# Patient Record
Sex: MALE | Race: WHITE | NOT HISPANIC OR LATINO | Employment: OTHER | URBAN - METROPOLITAN AREA
[De-identification: names, ages, dates, MRNs, and addresses within clinical notes are randomized per-mention and may not be internally consistent; named-entity substitution may affect disease eponyms.]

---

## 2023-08-31 PROBLEM — Z90.49 S/P CHOLECYSTECTOMY: Status: ACTIVE | Noted: 2023-08-31

## 2023-08-31 PROBLEM — F43.21 SITUATIONAL DEPRESSION: Status: ACTIVE | Noted: 2023-08-31

## 2023-08-31 PROBLEM — Z93.1: Status: ACTIVE | Noted: 2023-08-31

## 2023-08-31 PROBLEM — R41.3 MEMORY DIFFICULTY: Status: ACTIVE | Noted: 2023-08-31

## 2023-08-31 PROBLEM — G95.9 CERVICAL MYELOPATHY WITH CERVICAL RADICULOPATHY (MULTI): Status: ACTIVE | Noted: 2023-08-31

## 2023-08-31 PROBLEM — R41.840 ATTENTION DISTURBANCE: Status: ACTIVE | Noted: 2023-08-31

## 2023-08-31 PROBLEM — E55.9 VITAMIN D DEFICIENCY: Status: ACTIVE | Noted: 2023-08-31

## 2023-08-31 PROBLEM — E03.9 ACQUIRED HYPOTHYROIDISM: Status: ACTIVE | Noted: 2023-08-31

## 2023-08-31 PROBLEM — F41.9 ANXIETY DISORDER: Status: ACTIVE | Noted: 2023-08-31
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
## 2023-08-31 PROBLEM — C32.0 MALIGNANT NEOPLASM OF GLOTTIS (MULTI): Status: ACTIVE | Noted: 2023-08-31

## 2023-08-31 PROBLEM — K21.9 GERD (GASTROESOPHAGEAL REFLUX DISEASE): Status: ACTIVE | Noted: 2023-08-31

## 2023-08-31 PROBLEM — R41.89 COGNITIVE DEFICITS: Status: ACTIVE | Noted: 2023-08-31

## 2023-08-31 PROBLEM — M54.12 CERVICAL MYELOPATHY WITH CERVICAL RADICULOPATHY (MULTI): Status: ACTIVE | Noted: 2023-08-31

## 2023-08-31 PROBLEM — T66.XXXA COMPLICATION OF RADIOTHERAPY: Status: ACTIVE | Noted: 2023-08-31

## 2023-08-31 PROBLEM — J38.01 UNILATERAL PARTIAL VOCAL CORD PARALYSIS: Status: ACTIVE | Noted: 2023-08-31

## 2023-08-31 PROBLEM — M96.1 CERVICAL POST-LAMINECTOMY SYNDROME: Status: ACTIVE | Noted: 2023-08-31

## 2023-08-31 PROBLEM — M48.02 CERVICAL SPINAL STENOSIS: Status: ACTIVE | Noted: 2023-08-31

## 2023-08-31 RX ORDER — LEVOTHYROXINE SODIUM 75 UG/1
1 TABLET ORAL DAILY
COMMUNITY
End: 2024-05-31 | Stop reason: WASHOUT

## 2023-08-31 RX ORDER — GABAPENTIN 250 MG/5ML
12 SOLUTION ORAL 3 TIMES DAILY
COMMUNITY
Start: 2018-04-03 | End: 2023-11-17 | Stop reason: SDUPTHER

## 2023-08-31 RX ORDER — KETOCONAZOLE 20 MG/G
CREAM TOPICAL 2 TIMES DAILY
COMMUNITY
Start: 2022-05-25 | End: 2024-05-31 | Stop reason: WASHOUT

## 2023-08-31 RX ORDER — METHOCARBAMOL 500 MG/1
TABLET, FILM COATED ORAL
COMMUNITY
Start: 2023-01-30 | End: 2024-01-22 | Stop reason: SDUPTHER

## 2023-08-31 RX ORDER — OXYCODONE HYDROCHLORIDE 10 MG/1
1 TABLET ORAL EVERY 4 HOURS PRN
COMMUNITY
Start: 2022-03-11 | End: 2023-10-05 | Stop reason: SDUPTHER

## 2023-08-31 RX ORDER — LEVOTHYROXINE SODIUM 112 UG/1
1 TABLET ORAL
COMMUNITY
Start: 2022-09-08

## 2023-09-19 LAB — THYROTROPIN (MIU/L) IN SER/PLAS BY DETECTION LIMIT <= 0.05 MIU/L: 0.49 MIU/L (ref 0.44–3.98)

## 2023-10-05 DIAGNOSIS — G89.3 CANCER RELATED PAIN: Primary | ICD-10-CM

## 2023-10-06 RX ORDER — OXYCODONE HYDROCHLORIDE 10 MG/1
10 TABLET ORAL EVERY 6 HOURS PRN
Qty: 120 TABLET | Refills: 0 | Status: SHIPPED | OUTPATIENT
Start: 2023-10-06 | End: 2023-11-02 | Stop reason: SDUPTHER

## 2023-10-09 ENCOUNTER — OFFICE VISIT (OUTPATIENT)
Dept: HEMATOLOGY/ONCOLOGY | Facility: CLINIC | Age: 58
End: 2023-10-09
Payer: COMMERCIAL

## 2023-10-09 VITALS
HEART RATE: 95 BPM | WEIGHT: 194.67 LBS | HEIGHT: 70 IN | RESPIRATION RATE: 18 BRPM | DIASTOLIC BLOOD PRESSURE: 88 MMHG | SYSTOLIC BLOOD PRESSURE: 165 MMHG | BODY MASS INDEX: 27.87 KG/M2 | TEMPERATURE: 96.4 F | OXYGEN SATURATION: 97 %

## 2023-10-09 DIAGNOSIS — G89.3 CANCER RELATED PAIN: ICD-10-CM

## 2023-10-09 DIAGNOSIS — C32.0 MALIGNANT NEOPLASM OF GLOTTIS (MULTI): ICD-10-CM

## 2023-10-09 DIAGNOSIS — Z51.5 ENCOUNTER FOR PALLIATIVE CARE: Primary | ICD-10-CM

## 2023-10-09 PROCEDURE — 99213 OFFICE O/P EST LOW 20 MIN: CPT | Performed by: NURSE PRACTITIONER

## 2023-10-09 ASSESSMENT — PAIN SCALES - GENERAL: PAINLEVEL: 0-NO PAIN

## 2023-10-09 ASSESSMENT — PATIENT HEALTH QUESTIONNAIRE - PHQ9
SUM OF ALL RESPONSES TO PHQ9 QUESTIONS 1 AND 2: 0
1. LITTLE INTEREST OR PLEASURE IN DOING THINGS: NOT AT ALL
2. FEELING DOWN, DEPRESSED OR HOPELESS: NOT AT ALL

## 2023-10-09 ASSESSMENT — ENCOUNTER SYMPTOMS
OCCASIONAL FEELINGS OF UNSTEADINESS: 0
DEPRESSION: 0
LOSS OF SENSATION IN FEET: 0

## 2023-10-09 ASSESSMENT — COLUMBIA-SUICIDE SEVERITY RATING SCALE - C-SSRS
6. HAVE YOU EVER DONE ANYTHING, STARTED TO DO ANYTHING, OR PREPARED TO DO ANYTHING TO END YOUR LIFE?: NO
2. HAVE YOU ACTUALLY HAD ANY THOUGHTS OF KILLING YOURSELF?: NO
1. IN THE PAST MONTH, HAVE YOU WISHED YOU WERE DEAD OR WISHED YOU COULD GO TO SLEEP AND NOT WAKE UP?: NO

## 2023-10-09 NOTE — PROGRESS NOTES
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   -has chronic pain related to past surgery for his previous cancer  -Last saw ENT in 3/2023, will follow up again in 6 months. At this appointment, he showed no evidence of disease  -PEG tube exchanged periodically; ENT recommends this soon.   -Patient takes nothing in by mouth due to vocal cord paralysis.    Subjective   HISTORY OF PRESENT ILLNESS: Blane Sharma is a 57 yo male with PMHx of recurrent laryngeal papillomatosis, pain , ETOH abuse (in recovery), and SCC of vocal cord.      He present to supportive oncology clinic for follow up for pain and symptom management.     Pain Assessment:  Pain Score: 7  Location:  neck/ throat    Symptom Assessment:  Pain:somewhat  Headache: none  Dizziness:none  Lack of energy: none  Difficulty sleeping: none  Worrying: none  Anxiety: none  Depression: none  Pain in mouth/swallowing: a little  Dry mouth: a little  Taste changes: none  Shortness of breath: none  Lack of appetite: none   Nausea: a little  Vomiting: none  Constipation: none  Diarrhea: none  Sore muscles: none  Numbness or tingling in hands/feet/other: somewhat  Weight loss: none      Information obtained from: interview of patient  ______________________________________________________________________        Objective                PHYSICAL EXAMINATION   Vital Signs:   Vital signs reviewed  Vitals:    10/09/23 1005   BP: 165/88   Pulse: 95   Resp: 18   Temp: 35.8 °C (96.4 °F)   SpO2: 97%     Pain Score:        Physical Exam    ASSESSMENT/PLAN    Pain  Pain is: post-operative  Type: neuropathic  Pain control: well-controlled  Home regimen:   - Continue Oxycodone 10 mg by mouth q6 as needed. Rx sent for fill on 11/4    - Continue 900 mg gabapentin liquid 3 times a day. Rx sent for fill on 10/18.   - Continue 500mg Methocarbamol 1-2 tablets as needed for muscle spasms. Usually taking 1000mg BID.  Intolerances/previously tried:   - Stopped 10mg Baclofen, pt reported urinary retention     Opioid Use  Medication Management:   - OARRS report reviewed with no aberrant behavior; consistent with  prescriptions/records and patient history  - MED 60.  Overdose Risk Score 250.   This has been discussed with patient.   - We will continue to closely monitor the patient for signs of prescription misuse including UDS, OARRS review and subjective reports at each visit.  - no concurrent benzodiazepine use   - I am a provider who either is or has consulted and collaborated with a provider certified in Hospice and Palliative Medicine and have conducted a face-face visit and examination for this patient.  - Routine Urine Drug Screen completed: 7/10/23, (+) THC, appropriately positive for opioids and negative for illicit substances  - Controlled Substance Agreement completed: 4/17/23  - Specifically discussed that controlled substance prescriptions will only be provided by our group as outlined in the completed agreement  - Prescribed naloxone: pt declined   - Red Flags: history of ETOH misuse     Constipation  At risk for constipation related to opioids,  currently not constipated  Home regimen: -continue 1/2 cap of miralax daily PRN    Altered Mood  Chronic depression related to  psychosocial issues outside of cancer treatment     controlled with home regimen  Home regimen:   -Has a support dog. Feels supported by fiance and daughter.   - Has refused offer for evaluation by psychiatry at previous visit.  - His velma is a source of support. Refer to  PRN    Next Follow-Up Visit:  Return to clinic in 3 months    Signature and billing  Medical complexity was low level due to due to complexity of problems, extensive data review, and high  risk of management/treatment.  Time was spent on the following: Time Directly with Patient/Family/Caregiver, Documentation Time. Total time spent: 30     Some elements copied from my note on 7/10/23, the elements have been updated and all reflect current decision making from today, 10/9/2023.      Plan of Care discussed with: Patient    SIGNATURE: CARLO Barlow-CNP    Contact information:  Supportive and Palliative Oncology  Monday-Friday 8 AM-5 PM  Phone:  [phone], press option #5, then option #1.   Or Epic Secure Chat

## 2023-10-31 ENCOUNTER — NUTRITION (OUTPATIENT)
Dept: HEMATOLOGY/ONCOLOGY | Facility: HOSPITAL | Age: 58
End: 2023-10-31
Payer: COMMERCIAL

## 2023-10-31 NOTE — PROGRESS NOTES
NUTRITION COMMUNICATION NOTE    Blane Sharma     REASON FOR COMMUNICATION:     Received a VM from [facility]  There are many shortages in TF products  Patient will need to receive Isosource 1.5 and will receive in 2-5 business days.  It is too early to call but will call today to let him know     Wt Readings from Last 10 Encounters:   10/09/23 88.3 kg (194 lb 10.7 oz)   06/16/23 91.3 kg (201 lb 3 oz)   03/17/23 90.5 kg (199 lb 9 oz)   01/30/23 91.9 kg (202 lb 9.6 oz)   11/07/22 84.1 kg (185 lb 6.5 oz)   09/16/22 90.3 kg (199 lb)   08/15/22 89.9 kg (198 lb 3.1 oz)   06/22/22 88.2 kg (194 lb 6 oz)   05/25/22 88.5 kg (195 lb 3 oz)   04/27/22 86.3 kg (190 lb 3 oz)       TF had been   Confirmed order is for Osmolite 1.5, 6 cans/day provides: 2130 kcal, 89.4g PRO, and 1086 mL free water  Uses large bore feeding bags  (Patient was transitioned from Vital 1.5 to the Osmolite 1.5)    New TF will be Isosource 1.5- 6 per day to provide 2250 calories, 85 g protein and 955 ml of free water.            Time Spent  Prep time on day of patient encounter: 10 minutes  Time spent directly with patient, family or caregiver: 10 minutes  Additional Time Spent on Patient Care Activities: 5 minutes  Documentation Time: 15 minutes  Other Time Spent: 0 minutes  Total: 40 minutes

## 2023-11-02 ENCOUNTER — TELEPHONE (OUTPATIENT)
Dept: ADMISSION | Facility: HOSPITAL | Age: 58
End: 2023-11-02
Payer: COMMERCIAL

## 2023-11-02 DIAGNOSIS — G89.3 CANCER RELATED PAIN: ICD-10-CM

## 2023-11-02 RX ORDER — OXYCODONE HYDROCHLORIDE 10 MG/1
10 TABLET ORAL EVERY 6 HOURS PRN
Qty: 120 TABLET | Refills: 0 | Status: SHIPPED | OUTPATIENT
Start: 2023-11-02 | End: 2023-12-01 | Stop reason: SDUPTHER

## 2023-11-02 NOTE — TELEPHONE ENCOUNTER
OARRS report reviewed and reflects  prescription history, no aberrancy noted. Per OARRS, patient last filled Oxycodone 10 mg #120/30  day supply on 10/6/23 Per last visit with Provider Lisseth NP on 10/9/23 patient to continue Oxycodone. Patient with follow up visit scheduled with Provider Lisseth NP on 1/9/2024. Patient updated that script dated 11/4/23 has already been sent to [facility] by Mona Montanez NP at time of last visit . Call to [facility] to confirm. Pharmacist states that she does not  have the script. Secure message sent to Provider Lisseth requesting a new script.  Patient updated.

## 2023-11-02 NOTE — TELEPHONE ENCOUNTER
Blane Sharma called the refill line for Oxycodone 10mg. Would like refills to be sent to [facility] pharmacy on file. Message sent to Palliative Care team to send in.

## 2023-11-17 ENCOUNTER — TELEPHONE (OUTPATIENT)
Dept: ADMISSION | Facility: HOSPITAL | Age: 58
End: 2023-11-17
Payer: COMMERCIAL

## 2023-11-17 DIAGNOSIS — M79.2 NEUROPATHIC PAIN: Primary | ICD-10-CM

## 2023-11-17 RX ORDER — GABAPENTIN 250 MG/5ML
900 SOLUTION ORAL 3 TIMES DAILY
Qty: 1620 ML | Refills: 0 | Status: SHIPPED | OUTPATIENT
Start: 2023-11-17 | End: 2023-12-15 | Stop reason: SDUPTHER

## 2023-11-17 NOTE — TELEPHONE ENCOUNTER
Medication Refill-  Gabapentin 250mg/5ml      Pharmacy-  [facility] #[id number]

## 2023-11-17 NOTE — TELEPHONE ENCOUNTER
OARRS report reviewed and reflects  prescription history, no aberrancy noted. Per OARRS, patient due for Gabapentin #250ml/30 day supply (taking 18ml TID). Per last visit with Mona Montanez CNP on 10/9/23 patient to continue medication. Patient with follow up visit scheduled with Mona on 1/9/24. Patient updated that medication will be sent to [facility]. Refill request routed to provider.     Patient states his spouse (also a patient of Mona Montanez CNP also has refill needs). I will address these, as well.

## 2023-12-01 ENCOUNTER — TELEPHONE (OUTPATIENT)
Dept: ADMISSION | Facility: HOSPITAL | Age: 58
End: 2023-12-01
Payer: COMMERCIAL

## 2023-12-01 DIAGNOSIS — G89.3 CANCER RELATED PAIN: ICD-10-CM

## 2023-12-01 RX ORDER — OXYCODONE HYDROCHLORIDE 10 MG/1
10 TABLET ORAL EVERY 6 HOURS PRN
Qty: 120 TABLET | Refills: 0 | Status: SHIPPED | OUTPATIENT
Start: 2023-12-01 | End: 2024-01-02 | Stop reason: SDUPTHER

## 2023-12-01 NOTE — TELEPHONE ENCOUNTER
Pt requesting a refill of oxycodone 10mg every 6hrs prn, #120.  Preferred pharmacy is [facility] in [place].

## 2023-12-08 ENCOUNTER — TELEPHONE (OUTPATIENT)
Dept: OTOLARYNGOLOGY | Facility: HOSPITAL | Age: 58
End: 2023-12-08
Payer: COMMERCIAL

## 2023-12-08 DIAGNOSIS — J98.8 AIRWAY OBSTRUCTION: ICD-10-CM

## 2023-12-08 DIAGNOSIS — C32.0 MALIGNANT NEOPLASM OF GLOTTIS (MULTI): ICD-10-CM

## 2023-12-08 DIAGNOSIS — R06.02 SHORTNESS OF BREATH ON EXERTION: ICD-10-CM

## 2023-12-08 NOTE — TELEPHONE ENCOUNTER
Message left for Martin that I won't be able to answer this question until Tuesday next week as Dr. Lipscomb is out of the office until Monday.  He's in surgery on Monday and I'm working with another provider on Monday, so I won't see him until Tuesday.    I did review the chart and in 2019 Dr. 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

## 2023-12-08 NOTE — TELEPHONE ENCOUNTER
Dr. Lipscomb did sign for the placard even though he was out of town.    I printed the script and called Blane to inform him.  He was appreciative.  I asked if he wanted me to mail it to him and he stated that would be fine.    Script for handicap placard mailed to his home.

## 2023-12-15 ENCOUNTER — TELEPHONE (OUTPATIENT)
Dept: HEMATOLOGY/ONCOLOGY | Facility: CLINIC | Age: 58
End: 2023-12-15
Payer: COMMERCIAL

## 2023-12-15 DIAGNOSIS — M79.2 NEUROPATHIC PAIN: ICD-10-CM

## 2023-12-15 RX ORDER — GABAPENTIN 250 MG/5ML
900 SOLUTION ORAL 3 TIMES DAILY
Qty: 1620 ML | Refills: 0 | Status: SHIPPED | OUTPATIENT
Start: 2023-12-15 | End: 2024-01-15 | Stop reason: SDUPTHER

## 2023-12-15 NOTE — TELEPHONE ENCOUNTER
OARRS report reviewed and reflects  prescription history, no aberrancy noted. Per OARRS, patient due for Gabapentin 12/16/23. Per last visit with Mona Montanez CNP on 10/9/23 patient to continue Gabapentin 900mg TID (prescribed liquid form). Patient with follow up visit scheduled with Mona on 1/9/24. Patient updated that medication will be sent to [facility] via . Refill request routed to covering provider.

## 2024-01-02 ENCOUNTER — TELEPHONE (OUTPATIENT)
Dept: ADMISSION | Facility: HOSPITAL | Age: 59
End: 2024-01-02
Payer: COMMERCIAL

## 2024-01-02 DIAGNOSIS — G89.3 CANCER RELATED PAIN: ICD-10-CM

## 2024-01-02 RX ORDER — OXYCODONE HYDROCHLORIDE 10 MG/1
10 TABLET ORAL EVERY 6 HOURS PRN
Qty: 120 TABLET | Refills: 0 | Status: SHIPPED | OUTPATIENT
Start: 2024-01-02 | End: 2024-01-22 | Stop reason: SDUPTHER

## 2024-01-02 NOTE — TELEPHONE ENCOUNTER
Pt requesting refill   Oxycodone 10mg. 1 tablet every 6 hrs prn  Pharmacy on file   Last FUV 10/9 with next FUV 1/9

## 2024-01-09 ENCOUNTER — APPOINTMENT (OUTPATIENT)
Dept: PALLIATIVE MEDICINE | Facility: CLINIC | Age: 59
End: 2024-01-09
Payer: COMMERCIAL

## 2024-01-15 ENCOUNTER — DOCUMENTATION (OUTPATIENT)
Dept: PALLIATIVE MEDICINE | Facility: HOSPITAL | Age: 59
End: 2024-01-15
Payer: COMMERCIAL

## 2024-01-15 DIAGNOSIS — M79.2 NEUROPATHIC PAIN: ICD-10-CM

## 2024-01-15 RX ORDER — GABAPENTIN 250 MG/5ML
900 SOLUTION ORAL 3 TIMES DAILY
Qty: 1620 ML | Refills: 0 | Status: SHIPPED | OUTPATIENT
Start: 2024-01-15 | End: 2024-01-22 | Stop reason: SDUPTHER

## 2024-01-15 NOTE — PROGRESS NOTES
Received faxed refill request from [facility]. Per OARRS , patient is due for refill. Last fill was on 12/18/23 1350ml/#30 days ( short supply). Per last visit with Provider Lisseth, Patient to continue Gabapentin 900 mg TID. Follow up visit scheduled for 1/22/24. Script pended to Provider Lisseth for approval.

## 2024-01-17 ENCOUNTER — HOSPITAL ENCOUNTER (OUTPATIENT)
Dept: RADIOLOGY | Facility: EXTERNAL LOCATION | Age: 59
Discharge: HOME | End: 2024-01-17

## 2024-01-17 DIAGNOSIS — R05.9 COUGH, UNSPECIFIED TYPE: ICD-10-CM

## 2024-01-22 ENCOUNTER — OFFICE VISIT (OUTPATIENT)
Dept: PALLIATIVE MEDICINE | Facility: CLINIC | Age: 59
End: 2024-01-22
Payer: COMMERCIAL

## 2024-01-22 VITALS
DIASTOLIC BLOOD PRESSURE: 93 MMHG | SYSTOLIC BLOOD PRESSURE: 145 MMHG | TEMPERATURE: 98.6 F | BODY MASS INDEX: 26.75 KG/M2 | HEART RATE: 92 BPM | OXYGEN SATURATION: 92 % | RESPIRATION RATE: 18 BRPM | WEIGHT: 184.75 LBS

## 2024-01-22 DIAGNOSIS — M79.2 NEUROPATHIC PAIN: ICD-10-CM

## 2024-01-22 DIAGNOSIS — G89.3 CANCER RELATED PAIN: ICD-10-CM

## 2024-01-22 DIAGNOSIS — Z51.5 ENCOUNTER FOR PALLIATIVE CARE: Primary | ICD-10-CM

## 2024-01-22 PROCEDURE — 99213 OFFICE O/P EST LOW 20 MIN: CPT | Performed by: NURSE PRACTITIONER

## 2024-01-22 PROCEDURE — 1036F TOBACCO NON-USER: CPT | Performed by: NURSE PRACTITIONER

## 2024-01-22 RX ORDER — METHOCARBAMOL 500 MG/1
500-1000 TABLET, FILM COATED ORAL 2 TIMES DAILY PRN
Qty: 120 TABLET | Refills: 3 | Status: SHIPPED | OUTPATIENT
Start: 2024-01-22 | End: 2024-02-21

## 2024-01-22 RX ORDER — GABAPENTIN 250 MG/5ML
900 SOLUTION ORAL 3 TIMES DAILY
Qty: 1620 ML | Refills: 0 | Status: SHIPPED | OUTPATIENT
Start: 2024-01-22 | End: 2024-03-19 | Stop reason: SDUPTHER

## 2024-01-22 RX ORDER — OXYCODONE HYDROCHLORIDE 10 MG/1
10 TABLET ORAL EVERY 6 HOURS PRN
Qty: 120 TABLET | Refills: 0 | Status: SHIPPED | OUTPATIENT
Start: 2024-01-22 | End: 2024-02-29 | Stop reason: SDUPTHER

## 2024-01-22 ASSESSMENT — ENCOUNTER SYMPTOMS
LOSS OF SENSATION IN FEET: 0
OCCASIONAL FEELINGS OF UNSTEADINESS: 0
DEPRESSION: 0

## 2024-01-22 ASSESSMENT — PATIENT HEALTH QUESTIONNAIRE - PHQ9
10. IF YOU CHECKED OFF ANY PROBLEMS, HOW DIFFICULT HAVE THESE PROBLEMS MADE IT FOR YOU TO DO YOUR WORK, TAKE CARE OF THINGS AT HOME, OR GET ALONG WITH OTHER PEOPLE: NOT DIFFICULT AT ALL
2. FEELING DOWN, DEPRESSED OR HOPELESS: NOT AT ALL
1. LITTLE INTEREST OR PLEASURE IN DOING THINGS: SEVERAL DAYS
SUM OF ALL RESPONSES TO PHQ9 QUESTIONS 1 AND 2: 1

## 2024-01-22 ASSESSMENT — COLUMBIA-SUICIDE SEVERITY RATING SCALE - C-SSRS
1. IN THE PAST MONTH, HAVE YOU WISHED YOU WERE DEAD OR WISHED YOU COULD GO TO SLEEP AND NOT WAKE UP?: NO
6. HAVE YOU EVER DONE ANYTHING, STARTED TO DO ANYTHING, OR PREPARED TO DO ANYTHING TO END YOUR LIFE?: NO
2. HAVE YOU ACTUALLY HAD ANY THOUGHTS OF KILLING YOURSELF?: NO

## 2024-01-22 ASSESSMENT — PAIN SCALES - GENERAL: PAINLEVEL: 4

## 2024-01-22 NOTE — PROGRESS NOTES
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   -has chronic pain related to past surgery for his previous cancer  -Last saw ENT in 3/2023, will follow up again in 6 months. At this appointment, he showed no evidence of disease  -PEG tube exchanged periodically; ENT recommends this soon.   -Patient takes nothing in by mouth due to vocal cord paralysis.    Subjective   HISTORY OF PRESENT ILLNESS: Blane Sharma is a 57 yo male with PMHx of recurrent laryngeal papillomatosis, pain , ETOH abuse (in recovery), and SCC of vocal cord.      He present to supportive oncology clinic for follow up for pain and symptom management.     Pt presents for follow up alone today. Has been on antibiotics for PNA for 3 weeks. Pain has been \"up and down.\" Taking oxycodone 3x/day. Moving bowels well. No N/V. Mood is stable. Sleep is okay. Energy levels have been low since PNA, can only work for 5-10 mins before getting out of breath and needing to take a break.     Pain Assessment:  Pain Score: 7  Location: Throat    Symptom Assessment:  Pain:somewhat  Headache: none  Dizziness:none  Lack of energy: somewhat   Difficulty sleeping: none  Worrying: none  Anxiety: none  Depression: none  Pain in mouth/swallowing: a little  Dry mouth: a little  Taste changes: none  Shortness of breath: a little   Lack of appetite: none   Nausea: none  Vomiting: none  Constipation: none  Diarrhea: none  Sore muscles: none  Numbness or tingling in hands/feet/other: somewhat  Weight loss: none      Information obtained from: interview of patient  ______________________________________________________________________        Objective                PHYSICAL EXAMINATION   Vital Signs:   Vital signs " reviewed  Vitals:    01/22/24 1017   BP: (!) 145/93   Pulse: 92   Resp: 18   Temp: 37 °C (98.6 °F)   SpO2: 92%     Pain Score:        Physical Exam    ASSESSMENT/PLAN    Pain  Pain is: post-operative  Type: neuropathic  Pain control: well-controlled  Home regimen:   - Continue Oxycodone 10 mg by mouth q6 as needed. Rx sent for fill on 1/31  - Continue 900 mg gabapentin liquid 3 times a day. Rx sent for fill on 2/15   - Continue 500mg Methocarbamol 1-2 tablets as needed for muscle spasms. Usually taking 1000mg BID. Rx sent today.   Intolerances/previously tried:   - Stopped 10mg Baclofen, pt reported urinary retention     Opioid Use  Medication Management:   - OARRS report reviewed with no aberrant behavior; consistent with  prescriptions/records and patient history  - MED 60.  Overdose Risk Score 400.   This has been discussed with patient.   - We will continue to closely monitor the patient for signs of prescription misuse including UDS, OARRS review and subjective reports at each visit.  - no concurrent benzodiazepine use   - I am a provider who either is or has consulted and collaborated with a provider certified in Hospice and Palliative Medicine and have conducted a face-face visit and examination for this patient.  - Routine Urine Drug Screen completed: 7/10/23, (+) THC, appropriately positive for opioids and negative for illicit substances  - Controlled Substance Agreement completed: 4/17/23  - Specifically discussed that controlled substance prescriptions will only be provided by our group as outlined in the completed agreement  - Prescribed naloxone: pt declined   - Red Flags: history of ETOH misuse     Constipation  At risk for constipation related to opioids,  currently not constipated  Home regimen:   -continue 1/2 cap of miralax daily PRN    Altered Mood  Chronic depression related to  psychosocial issues outside of cancer treatment     controlled with home regimen  Home regimen:   -Has a support dog.  Feels supported by fiance and daughter.   - Has refused offer for evaluation by psychiatry at previous visit.  - His velma is a source of support. Refer to  PRN        Next Follow-Up Visit:  Return to clinic in 3 months    Signature and billing  Medical complexity was low level due to due to complexity of problems, extensive data review, and high risk of management/treatment.  Time was spent on the following: Time Directly with Patient/Family/Caregiver, Documentation Time. Total time spent: 25     Some elements copied from my note on 10/9/23, the elements have been updated and all reflect current decision making from today, 1/22/2024.      Plan of Care discussed with: Patient    SIGNATURE: CARLO Barlow-CNP    Contact information:  Supportive and Palliative Oncology  Monday-Friday 8 AM-5 PM  Phone:  [phone], press option #5, then option #1.   Or Epic Secure Chat

## 2024-02-15 ENCOUNTER — TELEPHONE (OUTPATIENT)
Dept: ADMISSION | Facility: HOSPITAL | Age: 59
End: 2024-02-15
Payer: COMMERCIAL

## 2024-02-15 NOTE — TELEPHONE ENCOUNTER
This is already at pharmacy with a fill date for today.  I left a VM at pharmacy to process the refill and also updated patient.

## 2024-02-15 NOTE — TELEPHONE ENCOUNTER
Blane Sharma called the refill line for Gabapentin. Would like refills to be sent to [facility] pharmacy on file. Message sent to Palliative Care team to send in.

## 2024-02-29 ENCOUNTER — TELEPHONE (OUTPATIENT)
Dept: HEMATOLOGY/ONCOLOGY | Facility: HOSPITAL | Age: 59
End: 2024-02-29
Payer: COMMERCIAL

## 2024-02-29 DIAGNOSIS — G89.3 CANCER RELATED PAIN: ICD-10-CM

## 2024-02-29 RX ORDER — OXYCODONE HYDROCHLORIDE 10 MG/1
10 TABLET ORAL EVERY 6 HOURS PRN
Qty: 120 TABLET | Refills: 0 | Status: SHIPPED | OUTPATIENT
Start: 2024-02-29 | End: 2024-03-30

## 2024-02-29 NOTE — TELEPHONE ENCOUNTER
Pt requesting refill   Oxycodone - not listed on current medication profile  Last FUV 1/22 with next FUV 4/22.

## 2024-03-19 ENCOUNTER — TELEPHONE (OUTPATIENT)
Dept: ADMISSION | Facility: HOSPITAL | Age: 59
End: 2024-03-19
Payer: COMMERCIAL

## 2024-03-19 DIAGNOSIS — M79.2 NEUROPATHIC PAIN: ICD-10-CM

## 2024-03-19 RX ORDER — GABAPENTIN 250 MG/5ML
900 SOLUTION ORAL 3 TIMES DAILY
Qty: 1620 ML | Refills: 1 | Status: SHIPPED | OUTPATIENT
Start: 2024-03-19 | End: 2024-04-22 | Stop reason: SDUPTHER

## 2024-03-19 NOTE — TELEPHONE ENCOUNTER
Blane Sharma called the refill line for Gabapentin. Would like refills to be sent to [facility] pharmacy on file. Message sent to Palliative Care team to send in.

## 2024-04-02 ENCOUNTER — NUTRITION (OUTPATIENT)
Dept: HEMATOLOGY/ONCOLOGY | Facility: HOSPITAL | Age: 59
End: 2024-04-02
Payer: COMMERCIAL

## 2024-04-02 NOTE — PROGRESS NOTES
"NUTRITION Assessment NOTE    Nutrition Assessment     Reason for Visit:  Blane Sharma is a 58 y.o. male who presents for Nutrition Counseling    Diagnosis: SCC of R vocal cord    Prior Treatment: s/p concurrent chemo and radiation with cisplatin and 5-FU, which was  completed in July 2011.     Subjective: Spoke with pt on the phone today. Discussed his current TF. Reports some frustration with getting his supplies d/t co-pay issues. Everything seems to be resolved now, but needs new order and updated notes. He still has product that he has been feeding himself with.         9/16/2022    12:27 PM 11/7/2022     9:48 AM 1/30/2023    10:27 AM 3/17/2023    10:59 AM 6/16/2023    10:10 AM 10/9/2023    10:05 AM 1/22/2024    10:17 AM   Vitals   Systolic  142 150   165 145   Diastolic  67 102   88 93   Heart Rate  91 96   95 92   Temp  35.9 °C (96.6 °F) 36.2 °C (97.2 °F) 36.8 °C (98.3 °F) 2 °C (35.6 °F) 35.8 °C (96.4 °F) 37 °C (98.6 °F)   Resp  16 18   18 18   Height (in) 1.753 m (5' 9\") 1.756 m (5' 9.13\") 1.756 m (5' 9.13\") 1.753 m (5' 9\") 1.753 m (5' 9\") 1.77 m (5' 9.69\")     Weight (lb) 199 185.41 202.6 199.56 201.19 194.67 184.75   BMI 29.39 kg/m2 27.27 kg/m2 29.8 kg/m2 29.47 kg/m2 29.71 kg/m2 28.18 kg/m2 26.75 kg/m2   BSA (m2) 2.1 m2 2.03 m2 2.12 m2 2.1 m2 2.11 m2 2.08 m2 2.03 m2   Visit Report      Report Report       Significant value       Wt Readings from Last 10 Encounters:   01/22/24 83.8 kg (184 lb 11.9 oz)   10/09/23 88.3 kg (194 lb 10.7 oz)   06/16/23 91.3 kg (201 lb 3 oz)   03/17/23 90.5 kg (199 lb 9 oz)   01/30/23 91.9 kg (202 lb 9.6 oz)   11/07/22 84.1 kg (185 lb 6.5 oz)   09/16/22 90.3 kg (199 lb)   08/15/22 89.9 kg (198 lb 3.1 oz)   06/22/22 88.2 kg (194 lb 6 oz)   05/25/22 88.5 kg (195 lb 3 oz)     Weight Change:  Weight loss of 4.5 kg (5%) x 3 months  Significant Weight Change: No    Lab Results   Component Value Date/Time    GLUCOSE 141 (H) 04/20/2022 0603     04/20/2022 0603    K 3.5 04/20/2022 " "0603     04/20/2022 0603    CO2 28 04/20/2022 0603    ANIONGAP 13 04/20/2022 0603    BUN 10 04/20/2022 0603    CREATININE 0.65 04/20/2022 0603    CALCIUM 8.4 (L) 04/20/2022 0603    ALBUMIN 4.2 06/27/2022 1405    ALKPHOS 49 06/27/2022 1405    PROT 8.0 (H) 06/27/2022 1405    AST 19 06/27/2022 1405    BILITOT 0.3 06/27/2022 1405    ALT 18 06/27/2022 1405     No results found for: \"VITD25\"     Food And Nutrient Intake:  Current Intake: >75%  of estimated energy needs  Via tf    Pt is NPO    Nutrition Focused Physical Exam Findings:  Performed/Deferred: Deferred due to be being virtual visit    ESTIMATED ENERGY NEEDS  Dosing weight: 83.8 kg  Calories per day: 9593-8713  determined by 23-28 kcal/kg  Protein (g) per day:  determined by 1.0-1.5 g/kg  Estimated fluid needs: 2634-8625 determined by 1 kcal/mL    NUTRITION DIAGNOSIS  Diagnosis Statement 1:  Diagnosis Status: New  Diagnosis : Swallowing difficulties  related to  SCC of vocal cord  as evidenced by  need for PEG for nutrition    NUTRITION INTERVENTION  Confirmed order is for Osmolite 1.5, 6 cans/day provides: 2130 kcal, 89.4g PRO, and 1086 mL free water  Uses large bore feeding bags    Diet Education Materials: need     MONITOR AND EVALUATION  Monitor and Evaluation: Tolerance to tube feed, Weight trend, and Fluid Intake    Need for follow-up: PRN    Time Spent  Prep time on day of patient encounter: 10 minutes  Time spent directly with patient, family or caregiver: 10 minutes  Documentation Time: 20 minutes        "

## 2024-04-08 ENCOUNTER — TELEPHONE (OUTPATIENT)
Dept: HEMATOLOGY/ONCOLOGY | Facility: HOSPITAL | Age: 59
End: 2024-04-08
Payer: COMMERCIAL

## 2024-04-08 DIAGNOSIS — G89.3 CANCER RELATED PAIN: Primary | ICD-10-CM

## 2024-04-08 RX ORDER — OXYCODONE HYDROCHLORIDE 10 MG/1
10 TABLET ORAL EVERY 6 HOURS PRN
COMMUNITY
End: 2024-04-08 | Stop reason: SDUPTHER

## 2024-04-08 RX ORDER — OXYCODONE HYDROCHLORIDE 10 MG/1
10 TABLET ORAL EVERY 6 HOURS PRN
Qty: 120 TABLET | Refills: 0 | Status: SHIPPED | OUTPATIENT
Start: 2024-04-08 | End: 2024-04-22 | Stop reason: SDUPTHER

## 2024-04-08 NOTE — TELEPHONE ENCOUNTER
Refill for oxycodone IR 10 mg every 6 hours 120/30 pended to provider.  OARRS reviewed.  Patient due for refill.  Patient to follow up with Mona Montanez NP 4/22.

## 2024-04-08 NOTE — TELEPHONE ENCOUNTER
Pt requests a refill of oxycodone 10mg q6 prn.  Preferred pharmacy is [facility] in [place].

## 2024-04-22 ENCOUNTER — OFFICE VISIT (OUTPATIENT)
Dept: PALLIATIVE MEDICINE | Facility: CLINIC | Age: 59
End: 2024-04-22
Payer: COMMERCIAL

## 2024-04-22 VITALS
RESPIRATION RATE: 17 BRPM | WEIGHT: 180.34 LBS | OXYGEN SATURATION: 92 % | HEIGHT: 70 IN | SYSTOLIC BLOOD PRESSURE: 149 MMHG | TEMPERATURE: 97.4 F | DIASTOLIC BLOOD PRESSURE: 95 MMHG | HEART RATE: 93 BPM | BODY MASS INDEX: 25.82 KG/M2

## 2024-04-22 DIAGNOSIS — C32.0 MALIGNANT NEOPLASM OF GLOTTIS (MULTI): ICD-10-CM

## 2024-04-22 DIAGNOSIS — M79.2 NEUROPATHIC PAIN: ICD-10-CM

## 2024-04-22 DIAGNOSIS — Z51.5 PALLIATIVE CARE ENCOUNTER: Primary | ICD-10-CM

## 2024-04-22 DIAGNOSIS — G89.3 CANCER RELATED PAIN: ICD-10-CM

## 2024-04-22 PROCEDURE — 99213 OFFICE O/P EST LOW 20 MIN: CPT | Performed by: NURSE PRACTITIONER

## 2024-04-22 PROCEDURE — 1036F TOBACCO NON-USER: CPT | Performed by: NURSE PRACTITIONER

## 2024-04-22 RX ORDER — OXYCODONE HYDROCHLORIDE 10 MG/1
10 TABLET ORAL EVERY 6 HOURS PRN
Qty: 120 TABLET | Refills: 0 | Status: SHIPPED | OUTPATIENT
Start: 2024-04-22 | End: 2024-05-22

## 2024-04-22 RX ORDER — GABAPENTIN 250 MG/5ML
900 SOLUTION ORAL 3 TIMES DAILY
Qty: 1620 ML | Refills: 3 | Status: SHIPPED | OUTPATIENT
Start: 2024-04-22 | End: 2024-06-21

## 2024-04-22 ASSESSMENT — PAIN SCALES - GENERAL: PAINLEVEL: 0-NO PAIN

## 2024-04-22 NOTE — PROGRESS NOTES
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   -has chronic pain related to past surgery for his previous cancer  -Last saw ENT in 3/2023, will follow up again in 6 months. At this appointment, he showed no evidence of disease  -PEG tube exchanged periodically; ENT recommends this soon.   -Patient takes nothing in by mouth due to vocal cord paralysis.    Subjective   HISTORY OF PRESENT ILLNESS: Blane Sharma is a 59 yo male with PMHx of recurrent laryngeal papillomatosis, pain , ETOH abuse (in recovery), and SCC of vocal cord.      He present to supportive oncology clinic for follow up for pain and symptom management.     Pt presents for follow up alone today. Pain has been a little more than usual lately, throat has been drying out more often. Taking oxycodone 10mg about 3x/day. Continues on gabapentin. Moving bowel well. No N/V, continues on regular TF schedule. Mood is stable. Has noticed some LOPEZ since PNA in January, still gets \"winded\" when trying to converse at times.     Pain Assessment:  Pain Score: 7  Location:      Symptom Assessment:  Pain:somewhat  Headache: none  Dizziness:none  Lack of energy: somewhat   Difficulty sleeping: none  Worrying: none  Anxiety: none  Depression: none  Pain in mouth/swallowing: a little  Dry mouth: a little  Taste changes: none  Shortness of breath: a little   Lack of appetite: none   Nausea: none  Vomiting: none  Constipation: none  Diarrhea: none  Sore muscles: none  Numbness or tingling in hands/feet/other: somewhat  Weight loss: none      Information obtained from: interview of patient  ______________________________________________________________________        Objective                PHYSICAL EXAMINATION   Vital " Signs:   Vital signs reviewed  Vitals:    04/22/24 0949   BP: (!) 149/95   Pulse: 93   Resp: 17   Temp: 36.3 °C (97.4 °F)   SpO2: 92%     Pain Score:        Physical Exam  Vitals reviewed.   Constitutional:       Appearance: Normal appearance.   HENT:      Head: Normocephalic.   Cardiovascular:      Rate and Rhythm: Normal rate.   Pulmonary:      Effort: Pulmonary effort is normal.   Abdominal:      Palpations: Abdomen is soft.   Musculoskeletal:         General: Normal range of motion.   Skin:     General: Skin is warm and dry.      Coloration: Skin is pale.   Neurological:      General: No focal deficit present.      Mental Status: He is alert and oriented to person, place, and time.   Psychiatric:         Mood and Affect: Mood normal.         Judgment: Judgment normal.         ASSESSMENT/PLAN    Pain  Pain is: post-operative  Type: neuropathic  Pain control: well-controlled  Home regimen:   - Continue Oxycodone 10 mg by mouth q6 as needed. Rx sent for fill on 5/7.  - Continue 900 mg gabapentin liquid 3 times a day. Rx sent for fill on 5/17.   - Continue 500mg Methocarbamol 1-2 tablets as needed for muscle spasms. Usually taking 1000mg BID.  Intolerances/previously tried:   - Stopped 10mg Baclofen, pt reported urinary retention     Opioid Use  Medication Management:   - OARRS report reviewed with no aberrant behavior; consistent with  prescriptions/records and patient history  - MED 60.  Overdose Risk Score 300.   This has been discussed with patient.   - We will continue to closely monitor the patient for signs of prescription misuse including UDS, OARRS review and subjective reports at each visit.  - no concurrent benzodiazepine use   - I am a provider who either is or has consulted and collaborated with a provider certified in Hospice and Palliative Medicine and have conducted a face-face visit and examination for this patient.  - Routine Urine Drug Screen completed: 7/10/23, (+) THC, appropriately positive  for opioids and negative for illicit substances  - Controlled Substance Agreement completed: 4/17/23  - Specifically discussed that controlled substance prescriptions will only be provided by our group as outlined in the completed agreement  - Prescribed naloxone: pt declined   - Red Flags: history of ETOH misuse     Constipation  At risk for constipation related to opioids,  currently not constipated  Home regimen:   -continue 1/2 cap of miralax daily PRN    Altered Mood  Chronic depression related to  psychosocial issues outside of cancer treatment     controlled with home regimen  Home regimen:   -Has a support dog. Feels supported by fiance and daughter.   - Has refused offer for evaluation by psychiatry at previous visit.  - His velma is a source of support. Refer to  PRN    LOPEZ  -s/p antibiotic treatment for PNA in January 2024  -encouraged pt to establish with new PCP, will make referral today  -if no improvement next visit, consider pulmonology referral         Next Follow-Up Visit:  Return to clinic in 3 months    Signature and billing  Medical complexity was low level due to due to complexity of problems, extensive data review, and high risk of management/treatment.  Time was spent on the following: Time Directly with Patient/Family/Caregiver, Documentation Time. Total time spent: 25     Some elements copied from my note on 1/22/24, the elements have been updated and all reflect current decision making from today, 4/22/2024.      Plan of Care discussed with: Patient    SIGNATURE: CARLO Barlow-CNP    Contact information:  [facility]ive [facility] Palliative Oncology  Monday-Friday 8 AM-5 PM  Phone:  [phone], press option #5, then option #1.   Or Epic Secure Chat

## 2024-04-23 ENCOUNTER — TELEPHONE (OUTPATIENT)
Dept: PALLIATIVE MEDICINE | Facility: CLINIC | Age: 59
End: 2024-04-23

## 2024-04-23 DIAGNOSIS — R06.02 SHORTNESS OF BREATH: Primary | ICD-10-CM

## 2024-05-28 ENCOUNTER — APPOINTMENT (OUTPATIENT)
Dept: RADIOLOGY | Facility: HOSPITAL | Age: 59
End: 2024-05-28
Payer: COMMERCIAL

## 2024-05-28 ENCOUNTER — APPOINTMENT (OUTPATIENT)
Dept: CARDIOLOGY | Facility: HOSPITAL | Age: 59
End: 2024-05-28
Payer: COMMERCIAL

## 2024-05-28 ENCOUNTER — HOSPITAL ENCOUNTER (EMERGENCY)
Facility: HOSPITAL | Age: 59
Discharge: LEFT BEFORE DISCHARGE | End: 2024-05-28
Attending: EMERGENCY MEDICINE
Payer: COMMERCIAL

## 2024-05-28 VITALS
HEIGHT: 69 IN | BODY MASS INDEX: 27.85 KG/M2 | OXYGEN SATURATION: 98 % | SYSTOLIC BLOOD PRESSURE: 154 MMHG | WEIGHT: 188 LBS | RESPIRATION RATE: 18 BRPM | DIASTOLIC BLOOD PRESSURE: 99 MMHG | TEMPERATURE: 96.3 F | HEART RATE: 98 BPM

## 2024-05-28 DIAGNOSIS — R06.02 SHORTNESS OF BREATH: ICD-10-CM

## 2024-05-28 DIAGNOSIS — J18.9 PNEUMONIA OF BOTH LUNGS DUE TO INFECTIOUS ORGANISM, UNSPECIFIED PART OF LUNG: Primary | ICD-10-CM

## 2024-05-28 DIAGNOSIS — R09.02 HYPOXIA: ICD-10-CM

## 2024-05-28 LAB
ALBUMIN SERPL BCP-MCNC: 3.5 G/DL (ref 3.4–5)
ALP SERPL-CCNC: 57 U/L (ref 33–136)
ALT SERPL W P-5'-P-CCNC: 30 U/L (ref 10–52)
ANION GAP BLDV CALCULATED.4IONS-SCNC: 7 MMOL/L (ref 10–25)
ANION GAP SERPL CALC-SCNC: 12 MMOL/L (ref 10–20)
AST SERPL W P-5'-P-CCNC: 25 U/L (ref 9–39)
ATRIAL RATE: 107 BPM
BASE EXCESS BLDV CALC-SCNC: 7.7 MMOL/L (ref -2–3)
BASOPHILS # BLD AUTO: 0.07 X10*3/UL (ref 0–0.1)
BASOPHILS NFR BLD AUTO: 0.4 %
BILIRUB SERPL-MCNC: 0.6 MG/DL (ref 0–1.2)
BNP SERPL-MCNC: 47 PG/ML (ref 0–99)
BODY TEMPERATURE: ABNORMAL
BUN SERPL-MCNC: 12 MG/DL (ref 6–23)
CA-I BLDV-SCNC: 1.17 MMOL/L (ref 1.1–1.33)
CALCIUM SERPL-MCNC: 9.4 MG/DL (ref 8.6–10.3)
CARDIAC TROPONIN I PNL SERPL HS: 11 NG/L (ref 0–20)
CARDIAC TROPONIN I PNL SERPL HS: 12 NG/L (ref 0–20)
CHLORIDE BLDV-SCNC: 88 MMOL/L (ref 98–107)
CHLORIDE SERPL-SCNC: 89 MMOL/L (ref 98–107)
CO2 SERPL-SCNC: 33 MMOL/L (ref 21–32)
CREAT SERPL-MCNC: 0.71 MG/DL (ref 0.5–1.3)
EGFRCR SERPLBLD CKD-EPI 2021: >90 ML/MIN/1.73M*2
EOSINOPHIL # BLD AUTO: 0.06 X10*3/UL (ref 0–0.7)
EOSINOPHIL NFR BLD AUTO: 0.3 %
ERYTHROCYTE [DISTWIDTH] IN BLOOD BY AUTOMATED COUNT: 13.6 % (ref 11.5–14.5)
FLUAV RNA RESP QL NAA+PROBE: NOT DETECTED
FLUBV RNA RESP QL NAA+PROBE: NOT DETECTED
GLUCOSE BLDV-MCNC: 288 MG/DL (ref 74–99)
GLUCOSE SERPL-MCNC: 271 MG/DL (ref 74–99)
HCO3 BLDV-SCNC: 32.7 MMOL/L (ref 22–26)
HCT VFR BLD AUTO: 44.3 % (ref 41–52)
HCT VFR BLD EST: 40 % (ref 41–52)
HGB BLD-MCNC: 14.7 G/DL (ref 13.5–17.5)
HGB BLDV-MCNC: 13.2 G/DL (ref 13.5–17.5)
IMM GRANULOCYTES # BLD AUTO: 0.08 X10*3/UL (ref 0–0.7)
IMM GRANULOCYTES NFR BLD AUTO: 0.4 % (ref 0–0.9)
INHALED O2 CONCENTRATION: 98 %
LACTATE BLDV-SCNC: 1.6 MMOL/L (ref 0.4–2)
LYMPHOCYTES # BLD AUTO: 1.12 X10*3/UL (ref 1.2–4.8)
LYMPHOCYTES NFR BLD AUTO: 6 %
MCH RBC QN AUTO: 29.8 PG (ref 26–34)
MCHC RBC AUTO-ENTMCNC: 33.2 G/DL (ref 32–36)
MCV RBC AUTO: 90 FL (ref 80–100)
MONOCYTES # BLD AUTO: 0.95 X10*3/UL (ref 0.1–1)
MONOCYTES NFR BLD AUTO: 5.1 %
NEUTROPHILS # BLD AUTO: 16.51 X10*3/UL (ref 1.2–7.7)
NEUTROPHILS NFR BLD AUTO: 87.8 %
NRBC BLD-RTO: 0 /100 WBCS (ref 0–0)
OXYHGB MFR BLDV: 77.2 % (ref 45–75)
P AXIS: 67 DEGREES
P OFFSET: 211 MS
P ONSET: 159 MS
PCO2 BLDV: 46 MM HG (ref 41–51)
PH BLDV: 7.46 PH (ref 7.33–7.43)
PLATELET # BLD AUTO: 449 X10*3/UL (ref 150–450)
PO2 BLDV: 46 MM HG (ref 35–45)
POTASSIUM BLDV-SCNC: 3.6 MMOL/L (ref 3.5–5.3)
POTASSIUM SERPL-SCNC: 3.8 MMOL/L (ref 3.5–5.3)
PR INTERVAL: 128 MS
PROT SERPL-MCNC: 9.2 G/DL (ref 6.4–8.2)
Q ONSET: 223 MS
QRS COUNT: 17 BEATS
QRS DURATION: 82 MS
QT INTERVAL: 356 MS
QTC CALCULATION(BAZETT): 475 MS
QTC FREDERICIA: 431 MS
R AXIS: 71 DEGREES
RBC # BLD AUTO: 4.93 X10*6/UL (ref 4.5–5.9)
RSV RNA RESP QL NAA+PROBE: NOT DETECTED
SAO2 % BLDV: 80 % (ref 45–75)
SARS-COV-2 RNA RESP QL NAA+PROBE: NOT DETECTED
SODIUM BLDV-SCNC: 124 MMOL/L (ref 136–145)
SODIUM SERPL-SCNC: 130 MMOL/L (ref 136–145)
T AXIS: 53 DEGREES
T OFFSET: 401 MS
VENTRICULAR RATE: 107 BPM
WBC # BLD AUTO: 18.8 X10*3/UL (ref 4.4–11.3)

## 2024-05-28 PROCEDURE — 84484 ASSAY OF TROPONIN QUANT: CPT | Mod: 91

## 2024-05-28 PROCEDURE — 84132 ASSAY OF SERUM POTASSIUM: CPT | Mod: 91

## 2024-05-28 PROCEDURE — 2500000004 HC RX 250 GENERAL PHARMACY W/ HCPCS (ALT 636 FOR OP/ED)

## 2024-05-28 PROCEDURE — 94760 N-INVAS EAR/PLS OXIMETRY 1: CPT

## 2024-05-28 PROCEDURE — 2500000005 HC RX 250 GENERAL PHARMACY W/O HCPCS

## 2024-05-28 PROCEDURE — 84484 ASSAY OF TROPONIN QUANT: CPT

## 2024-05-28 PROCEDURE — 87634 RSV DNA/RNA AMP PROBE: CPT

## 2024-05-28 PROCEDURE — 99285 EMERGENCY DEPT VISIT HI MDM: CPT | Mod: 25

## 2024-05-28 PROCEDURE — 83880 ASSAY OF NATRIURETIC PEPTIDE: CPT

## 2024-05-28 PROCEDURE — 87635 SARS-COV-2 COVID-19 AMP PRB: CPT

## 2024-05-28 PROCEDURE — 96375 TX/PRO/DX INJ NEW DRUG ADDON: CPT | Mod: 59

## 2024-05-28 PROCEDURE — 96365 THER/PROPH/DIAG IV INF INIT: CPT | Mod: 59

## 2024-05-28 PROCEDURE — 36415 COLL VENOUS BLD VENIPUNCTURE: CPT

## 2024-05-28 PROCEDURE — 93005 ELECTROCARDIOGRAM TRACING: CPT

## 2024-05-28 PROCEDURE — 96367 TX/PROPH/DG ADDL SEQ IV INF: CPT

## 2024-05-28 PROCEDURE — 71275 CT ANGIOGRAPHY CHEST: CPT

## 2024-05-28 PROCEDURE — 71275 CT ANGIOGRAPHY CHEST: CPT | Performed by: RADIOLOGY

## 2024-05-28 PROCEDURE — 2500000002 HC RX 250 W HCPCS SELF ADMINISTERED DRUGS (ALT 637 FOR MEDICARE OP, ALT 636 FOR OP/ED)

## 2024-05-28 PROCEDURE — 87040 BLOOD CULTURE FOR BACTERIA: CPT | Mod: 59,GENLAB

## 2024-05-28 PROCEDURE — 87075 CULTR BACTERIA EXCEPT BLOOD: CPT | Mod: 59,GENLAB

## 2024-05-28 PROCEDURE — 2550000001 HC RX 255 CONTRASTS: Performed by: EMERGENCY MEDICINE

## 2024-05-28 PROCEDURE — 94664 DEMO&/EVAL PT USE INHALER: CPT

## 2024-05-28 PROCEDURE — 85025 COMPLETE CBC W/AUTO DIFF WBC: CPT

## 2024-05-28 PROCEDURE — 96361 HYDRATE IV INFUSION ADD-ON: CPT

## 2024-05-28 RX ORDER — DOXYCYCLINE 100 MG/1
100 CAPSULE ORAL 2 TIMES DAILY
Qty: 20 CAPSULE | Refills: 0 | Status: SHIPPED | OUTPATIENT
Start: 2024-05-28 | End: 2024-06-04 | Stop reason: HOSPADM

## 2024-05-28 RX ORDER — IPRATROPIUM BROMIDE AND ALBUTEROL SULFATE 2.5; .5 MG/3ML; MG/3ML
3 SOLUTION RESPIRATORY (INHALATION) ONCE
Status: COMPLETED | OUTPATIENT
Start: 2024-05-28 | End: 2024-05-28

## 2024-05-28 RX ORDER — CEFTRIAXONE 1 G/50ML
1 INJECTION, SOLUTION INTRAVENOUS ONCE
Status: COMPLETED | OUTPATIENT
Start: 2024-05-28 | End: 2024-05-28

## 2024-05-28 RX ORDER — AMOXICILLIN AND CLAVULANATE POTASSIUM 875; 125 MG/1; MG/1
1 TABLET, FILM COATED ORAL EVERY 12 HOURS
Qty: 14 TABLET | Refills: 0 | Status: SHIPPED | OUTPATIENT
Start: 2024-05-28 | End: 2024-06-04

## 2024-05-28 RX ORDER — AZITHROMYCIN 100 MG/ML
INJECTION, POWDER, LYOPHILIZED, FOR SOLUTION INTRAVENOUS
Status: COMPLETED
Start: 2024-05-28 | End: 2024-05-28

## 2024-05-28 RX ADMIN — METHYLPREDNISOLONE SODIUM SUCCINATE 125 MG: 125 INJECTION, POWDER, FOR SOLUTION INTRAMUSCULAR; INTRAVENOUS at 12:19

## 2024-05-28 RX ADMIN — IPRATROPIUM BROMIDE AND ALBUTEROL SULFATE 3 ML: .5; 3 SOLUTION RESPIRATORY (INHALATION) at 12:07

## 2024-05-28 RX ADMIN — AZITHROMYCIN MONOHYDRATE 500 MG: 500 INJECTION, POWDER, LYOPHILIZED, FOR SOLUTION INTRAVENOUS at 14:15

## 2024-05-28 RX ADMIN — Medication 3 L/MIN: at 12:15

## 2024-05-28 RX ADMIN — CEFTRIAXONE 1 G: 1 INJECTION, SOLUTION INTRAVENOUS at 12:39

## 2024-05-28 RX ADMIN — SODIUM CHLORIDE, POTASSIUM CHLORIDE, SODIUM LACTATE AND CALCIUM CHLORIDE 500 ML: 600; 310; 30; 20 INJECTION, SOLUTION INTRAVENOUS at 12:13

## 2024-05-28 RX ADMIN — IOHEXOL 75 ML: 350 INJECTION, SOLUTION INTRAVENOUS at 13:31

## 2024-05-28 ASSESSMENT — COLUMBIA-SUICIDE SEVERITY RATING SCALE - C-SSRS
6. HAVE YOU EVER DONE ANYTHING, STARTED TO DO ANYTHING, OR PREPARED TO DO ANYTHING TO END YOUR LIFE?: NO
1. IN THE PAST MONTH, HAVE YOU WISHED YOU WERE DEAD OR WISHED YOU COULD GO TO SLEEP AND NOT WAKE UP?: NO
2. HAVE YOU ACTUALLY HAD ANY THOUGHTS OF KILLING YOURSELF?: NO

## 2024-05-28 ASSESSMENT — PAIN SCALES - GENERAL
PAINLEVEL_OUTOF10: 0 - NO PAIN

## 2024-05-28 ASSESSMENT — PAIN - FUNCTIONAL ASSESSMENT: PAIN_FUNCTIONAL_ASSESSMENT: 0-10

## 2024-05-28 NOTE — ED PROVIDER NOTES
HPI   Chief Complaint   Patient presents with    Shortness of Breath    Cough     Pt states feeling SOB and having a cough getting progressively worse over the last 3-4 weeks. Pt had pneumonia about 3 months ago. Pt denies any hx COPD or asthma. Pt was 86% RA       Patient is a 59-year-old male with significant PMH of malignant neoplasm of the glottis presents to the ED with cc of shortness of breath x 3 weeks that is progressively worsened.  Patient states he does not need oxygen at home.  Patient states he is done with treatment for the cancer in his larynx.  Patient states his oncologist is wanting to remove his larynx but he does not want this yet.  Patient denies any chest pain.  Patient does endorse fever states he has night sweats.  Patient states he has had a productive cough with yellow sputum.  Patient also endorses some rhinorrhea.  Patient denies any history of blood clots recent travel or surgery.  Patient is not on any blood thinners.  Patient denies any history of MIs.  Patient states shortness of breath is worse with exertion.  Patient denies any nausea vomiting abdominal pain diarrhea constipation melena or hematochezia.  Patient denies any history of asthma or COPD.  Patient is a former smoker quit in 2011.  Patient denies any alcohol or street drug abuse.                          [place] Coma Scale Score: 15                     Patient History   Past Medical History:   Diagnosis Date    Cellulitis, unspecified 04/06/2020    Cellulitis of skin    Cervicalgia 08/04/2017    Cervical pain    Disease of spinal cord, unspecified (Multi) 05/25/2022    Cervical myelopathy with cervical radiculopathy    Dysphagia, unspecified 03/11/2022    Dysphagia    Encounter for observation for other suspected diseases and conditions ruled out 09/16/2022    Observation for suspected malignant neoplasm    Gastro-esophageal reflux disease without esophagitis 09/05/2014    GERD (gastroesophageal reflux disease)     Gastrostomy malfunction (Multi) 2019    Mechanical complication of gastrostomy    Hypothyroidism, unspecified 2022    Acquired hypothyroidism    Low back pain, unspecified 2015    Lumbago    Malignant neoplasm of glottis (Multi) 2022    Malignant neoplasm of glottis    Neoplasm of uncertain behavior, unspecified 2019    Neoplasm of uncertain behavior    Paralysis of vocal cords and larynx, unilateral 2022    Unilateral partial vocal cord paralysis    Personal history of other diseases of the respiratory system 2017    History of acute sinusitis    Personal history of other diseases of the respiratory system 2016    History of acute bronchitis    Personal history of other diseases of the respiratory system 2017    History of acute sinusitis    Radiation sickness, unspecified, initial encounter 2014    Complication of radiotherapy    Spinal stenosis, cervical region 10/09/2015    Cervical spinal stenosis     Past Surgical History:   Procedure Laterality Date    NECK SURGERY  2016    Neck Surgery    OTHER SURGICAL HISTORY  2016    Laryngeal Surgery    OTHER SURGICAL HISTORY  2022    Umbilical hernia repair laparoscopic    OTHER SURGICAL HISTORY  2022    Cholecystectomy     Family History   Problem Relation Name Age of Onset    Iron deficiency Mother      Other (lung nodules) Mother      Cancer Other Family     Diabetes Other Family     Other (nonorganic sleep apnea) Other Family      Social History     Tobacco Use    Smoking status: Former     Current packs/day: 0.00     Types: Cigarettes     Quit date:      Years since quittin.4    Smokeless tobacco: Never   Vaping Use    Vaping status: Never Used   Substance Use Topics    Alcohol use: Not Currently    Drug use: Not Currently       Physical Exam   ED Triage Vitals [24 1130]   Temperature Heart Rate Respirations BP   35.7 °C (96.3 °F) (!) 107 20 (!) 118/101      Pulse Ox  Temp Source Heart Rate Source Patient Position   (!) 86 % Temporal -- --      BP Location FiO2 (%)     -- --       Physical Exam  HENT:      Head: Normocephalic.   Cardiovascular:      Rate and Rhythm: Regular rhythm. Tachycardia present.      Pulses: Normal pulses.   Pulmonary:      Effort: Pulmonary effort is normal.      Breath sounds: Decreased breath sounds present.   Abdominal:      Palpations: Abdomen is soft.      Tenderness: There is no abdominal tenderness. There is no guarding or rebound.   Musculoskeletal:         General: Normal range of motion.      Right lower leg: No edema.      Left lower leg: No edema.   Neurological:      General: No focal deficit present.      Mental Status: He is alert and oriented to person, place, and time.         ED Course & MDM   ED Course as of 05/28/24 1718   Tue May 28, 2024   1152 EKG interpretation performed at 1128 sinus tachycardia, normal axis no acute signs of ischemia.  Ventricular rate 107 bpm. []      ED Course User Index  [] Nicol Arnold PA-C         Diagnoses as of 05/28/24 1718   Pneumonia of both lungs due to infectious organism, unspecified part of lung   Shortness of breath   Hypoxia       Medical Decision Making  Medical Decision Making:  Patient presented as described in HPI. Patient case including ROS, PE, and treatment and plan discussed with ED attending if attached as cosigner. Due to patients presentation orders completed include as documented.  Patient presents to the ED with cc of shortness of breath x 3 weeks that is progressively worsened.  Patient was 82% on room air and placed on 4 L of oxygen here.  Patient is not normally on oxygen.  Patient denies any history of MI blood clots recent travel or surgery.  Patient has had a productive cough.  Patient is nontoxic-appearing, abdomen is soft and nontender lung sounds are diminished no peripheral edema.  Patient given DuoNeb solumedrol and some fluids.  Pending labs and imaging.   Leukocytosis of 18.8.  Patient given IV antibiotics.  Rest of labs unremarkable.  CT angio shows no PE patchy nodular bilateral infiltrates with pleural effusions and moderate lymphadenopathy differential includes pneumonia and metastatic disease.  Patient educated on these findings.  Patient educated on any transfer to [facility] where his oncologist is.  Do not believe [facility] is appropriate due to larynx cancer and would be difficult to get airway.  Patient is refusing transfer to [facility] does not want to pay for the ambulance which his insurance does not cover.  Patient states he cannot leave his car here at the hospital.  Patient educated that this is crucial and the risks.  Patient again denies.  Attempted to admit patient to our hospital however due to patient's significant medical history and wanting to be a full code they do not feel comfortable if they were needing to intubate the patient.  Patient again extensively spoke with about risks about going home and understands.  Patient left AMA.  Patient given antibiotics for home.  Patient educated on close follow-up.      This note has been transcribed using voice recognition and may contain grammatical errors, misplaced words, incorrect words, incorrect phrases or other errors.       I saw and evaluated the patient. I have personally performed a substantive portion of the encounter and have reviewed the resident´s/JUSTINE documentation and discussed the patient with the provider.  Please see below for further details.    Subjective:  Patient is a 59-year-old male with a history of glottal cancer and laryngeal cancer.  He has an increasing airway stenosis since 2011 with his initial diagnosis.  He was diagnosed with pneumonia about 3 months ago and is quite short of breath at that time.  The patient understands that at some point he will require surgery to maintain an airway.  He is followed at [facility] by Dr. Lipscomb.  Patient denies any recent fever or chills.   "He just feels he cannot breathe as well as he did last week.  He is concerned his pneumonia may have come back.    Objective:  Visit Vitals  /73   Pulse (!) 102   Temp 35.7 °C (96.3 °F) (Temporal)   Resp 20   Ht 1.753 m (5' 9\")   Wt 85.3 kg (188 lb)   SpO2 (!) 93%   BMI 27.76 kg/m²   Smoking Status Former   BSA 2.04 m²   Alert male talking in a whispering voice  Head atraumatic  Neck supple  Lungs sounds diminished without rales or rhonchi  Heart tones about 100 bpm and regular  Abdomen nontender  Skin clear  Assessment:  Laryngeal cancer with partial airway obstruction    EKG: Sinus tachycardia at 107 bpm, normal axis, possible left atrial lodgment, no acute ST elevations.  Interpreted by SUSAN Patino MD    Plan:  Patient refuses transfer to [facility] for evaluation and treatment of his cancer with increasing shortness of breath.  He is not a good candidate for admission here to [facility] due to his need for oncology evaluation and treatment.  Patient refuses transfer and understands the risk including sudden death.  He prefers to manage at home where he can take care of his wife.    SUSAN Patino MD    Procedure  Procedures     Nicol Arnold PA-C  05/28/24 2856       Richardson Patino MD  05/30/24 1047    Critical care time 0 minutes SUSAN Patino MD  07/16/24 1000    "

## 2024-05-30 ENCOUNTER — HOSPITAL ENCOUNTER (OUTPATIENT)
Dept: CARDIOLOGY | Facility: HOSPITAL | Age: 59
Discharge: HOME | End: 2024-05-30
Payer: COMMERCIAL

## 2024-05-30 PROCEDURE — 93005 ELECTROCARDIOGRAM TRACING: CPT

## 2024-05-31 ENCOUNTER — CLINICAL SUPPORT (OUTPATIENT)
Dept: EMERGENCY MEDICINE | Facility: HOSPITAL | Age: 59
DRG: 194 | End: 2024-05-31
Payer: COMMERCIAL

## 2024-05-31 ENCOUNTER — APPOINTMENT (OUTPATIENT)
Dept: RADIOLOGY | Facility: HOSPITAL | Age: 59
DRG: 194 | End: 2024-05-31
Payer: COMMERCIAL

## 2024-05-31 ENCOUNTER — HOSPITAL ENCOUNTER (INPATIENT)
Facility: HOSPITAL | Age: 59
LOS: 4 days | Discharge: HOME | DRG: 194 | End: 2024-06-04
Attending: EMERGENCY MEDICINE | Admitting: STUDENT IN AN ORGANIZED HEALTH CARE EDUCATION/TRAINING PROGRAM
Payer: COMMERCIAL

## 2024-05-31 DIAGNOSIS — A41.9 SEPSIS WITHOUT ACUTE ORGAN DYSFUNCTION, DUE TO UNSPECIFIED ORGANISM (MULTI): ICD-10-CM

## 2024-05-31 DIAGNOSIS — J18.9 PNEUMONIA DUE TO INFECTIOUS ORGANISM, UNSPECIFIED LATERALITY, UNSPECIFIED PART OF LUNG: ICD-10-CM

## 2024-05-31 DIAGNOSIS — J18.9 MULTIFOCAL PNEUMONIA: Primary | ICD-10-CM

## 2024-05-31 DIAGNOSIS — R07.82 INTERCOSTAL PAIN: ICD-10-CM

## 2024-05-31 LAB
ALBUMIN SERPL BCP-MCNC: 3.3 G/DL (ref 3.4–5)
ALP SERPL-CCNC: 56 U/L (ref 33–136)
ALT SERPL W P-5'-P-CCNC: 61 U/L (ref 10–52)
ANION GAP BLDV CALCULATED.4IONS-SCNC: 4 MMOL/L (ref 10–25)
ANION GAP SERPL CALC-SCNC: 16 MMOL/L (ref 10–20)
APTT PPP: 33 SECONDS (ref 27–38)
AST SERPL W P-5'-P-CCNC: 44 U/L (ref 9–39)
BASE EXCESS BLDV CALC-SCNC: 8.7 MMOL/L (ref -2–3)
BASOPHILS # BLD AUTO: 0.1 X10*3/UL (ref 0–0.1)
BASOPHILS NFR BLD AUTO: 0.6 %
BILIRUB SERPL-MCNC: 0.5 MG/DL (ref 0–1.2)
BNP SERPL-MCNC: 86 PG/ML (ref 0–99)
BODY TEMPERATURE: 37 DEGREES CELSIUS
BUN SERPL-MCNC: 13 MG/DL (ref 6–23)
CA-I BLDV-SCNC: 1.16 MMOL/L (ref 1.1–1.33)
CALCIUM SERPL-MCNC: 8.9 MG/DL (ref 8.6–10.6)
CARDIAC TROPONIN I PNL SERPL HS: 4 NG/L (ref 0–53)
CARDIAC TROPONIN I PNL SERPL HS: 4 NG/L (ref 0–53)
CHLORIDE BLDV-SCNC: 96 MMOL/L (ref 98–107)
CHLORIDE SERPL-SCNC: 93 MMOL/L (ref 98–107)
CO2 SERPL-SCNC: 28 MMOL/L (ref 21–32)
CREAT SERPL-MCNC: 0.68 MG/DL (ref 0.5–1.3)
EGFRCR SERPLBLD CKD-EPI 2021: >90 ML/MIN/1.73M*2
EOSINOPHIL # BLD AUTO: 0.15 X10*3/UL (ref 0–0.7)
EOSINOPHIL NFR BLD AUTO: 0.9 %
ERYTHROCYTE [DISTWIDTH] IN BLOOD BY AUTOMATED COUNT: 13.6 % (ref 11.5–14.5)
GLUCOSE BLDV-MCNC: 225 MG/DL (ref 74–99)
GLUCOSE SERPL-MCNC: 192 MG/DL (ref 74–99)
HCO3 BLDV-SCNC: 33.5 MMOL/L (ref 22–26)
HCT VFR BLD AUTO: 42.6 % (ref 41–52)
HCT VFR BLD EST: 45 % (ref 41–52)
HGB BLD-MCNC: 14.8 G/DL (ref 13.5–17.5)
HGB BLDV-MCNC: 15.1 G/DL (ref 13.5–17.5)
IMM GRANULOCYTES # BLD AUTO: 0.1 X10*3/UL (ref 0–0.7)
IMM GRANULOCYTES NFR BLD AUTO: 0.6 % (ref 0–0.9)
INHALED O2 CONCENTRATION: 21 %
INR PPP: 1.4 (ref 0.9–1.1)
LACTATE BLDV-SCNC: 2 MMOL/L (ref 0.4–2)
LACTATE SERPL-SCNC: 1.8 MMOL/L (ref 0.4–2)
LYMPHOCYTES # BLD AUTO: 1.44 X10*3/UL (ref 1.2–4.8)
LYMPHOCYTES NFR BLD AUTO: 8.4 %
MAGNESIUM SERPL-MCNC: 2.09 MG/DL (ref 1.6–2.4)
MCH RBC QN AUTO: 29.8 PG (ref 26–34)
MCHC RBC AUTO-ENTMCNC: 34.7 G/DL (ref 32–36)
MCV RBC AUTO: 86 FL (ref 80–100)
MONOCYTES # BLD AUTO: 0.93 X10*3/UL (ref 0.1–1)
MONOCYTES NFR BLD AUTO: 5.4 %
NEUTROPHILS # BLD AUTO: 14.4 X10*3/UL (ref 1.2–7.7)
NEUTROPHILS NFR BLD AUTO: 84.1 %
NRBC BLD-RTO: 0 /100 WBCS (ref 0–0)
OXYHGB MFR BLDV: 76.4 % (ref 45–75)
PCO2 BLDV: 45 MM HG (ref 41–51)
PH BLDV: 7.48 PH (ref 7.33–7.43)
PLATELET # BLD AUTO: 451 X10*3/UL (ref 150–450)
PO2 BLDV: 53 MM HG (ref 35–45)
POTASSIUM BLDV-SCNC: 4.7 MMOL/L (ref 3.5–5.3)
POTASSIUM SERPL-SCNC: 4.8 MMOL/L (ref 3.5–5.3)
PROT SERPL-MCNC: 8.8 G/DL (ref 6.4–8.2)
PROTHROMBIN TIME: 15.7 SECONDS (ref 9.8–12.8)
RBC # BLD AUTO: 4.96 X10*6/UL (ref 4.5–5.9)
SAO2 % BLDV: 78 % (ref 45–75)
SARS-COV-2 RNA RESP QL NAA+PROBE: NOT DETECTED
SODIUM BLDV-SCNC: 129 MMOL/L (ref 136–145)
SODIUM SERPL-SCNC: 132 MMOL/L (ref 136–145)
WBC # BLD AUTO: 17.1 X10*3/UL (ref 4.4–11.3)

## 2024-05-31 PROCEDURE — 99285 EMERGENCY DEPT VISIT HI MDM: CPT

## 2024-05-31 PROCEDURE — 1210000001 HC SEMI-PRIVATE ROOM DAILY

## 2024-05-31 PROCEDURE — 2500000002 HC RX 250 W HCPCS SELF ADMINISTERED DRUGS (ALT 637 FOR MEDICARE OP, ALT 636 FOR OP/ED): Mod: MUE

## 2024-05-31 PROCEDURE — 83605 ASSAY OF LACTIC ACID: CPT

## 2024-05-31 PROCEDURE — 71046 X-RAY EXAM CHEST 2 VIEWS: CPT

## 2024-05-31 PROCEDURE — 84132 ASSAY OF SERUM POTASSIUM: CPT | Mod: 91

## 2024-05-31 PROCEDURE — 84484 ASSAY OF TROPONIN QUANT: CPT

## 2024-05-31 PROCEDURE — 84132 ASSAY OF SERUM POTASSIUM: CPT | Mod: 91 | Performed by: EMERGENCY MEDICINE

## 2024-05-31 PROCEDURE — 87081 CULTURE SCREEN ONLY: CPT

## 2024-05-31 PROCEDURE — 87040 BLOOD CULTURE FOR BACTERIA: CPT

## 2024-05-31 PROCEDURE — 85025 COMPLETE CBC W/AUTO DIFF WBC: CPT

## 2024-05-31 PROCEDURE — 36415 COLL VENOUS BLD VENIPUNCTURE: CPT

## 2024-05-31 PROCEDURE — 74018 RADEX ABDOMEN 1 VIEW: CPT | Performed by: RADIOLOGY

## 2024-05-31 PROCEDURE — 2500000005 HC RX 250 GENERAL PHARMACY W/O HCPCS

## 2024-05-31 PROCEDURE — 2500000001 HC RX 250 WO HCPCS SELF ADMINISTERED DRUGS (ALT 637 FOR MEDICARE OP)

## 2024-05-31 PROCEDURE — 93010 ELECTROCARDIOGRAM REPORT: CPT | Performed by: EMERGENCY MEDICINE

## 2024-05-31 PROCEDURE — 94640 AIRWAY INHALATION TREATMENT: CPT | Mod: 59

## 2024-05-31 PROCEDURE — 83735 ASSAY OF MAGNESIUM: CPT

## 2024-05-31 PROCEDURE — 2500000004 HC RX 250 GENERAL PHARMACY W/ HCPCS (ALT 636 FOR OP/ED): Performed by: STUDENT IN AN ORGANIZED HEALTH CARE EDUCATION/TRAINING PROGRAM

## 2024-05-31 PROCEDURE — 87449 NOS EACH ORGANISM AG IA: CPT

## 2024-05-31 PROCEDURE — 85610 PROTHROMBIN TIME: CPT

## 2024-05-31 PROCEDURE — 71046 X-RAY EXAM CHEST 2 VIEWS: CPT | Performed by: RADIOLOGY

## 2024-05-31 PROCEDURE — 87635 SARS-COV-2 COVID-19 AMP PRB: CPT

## 2024-05-31 PROCEDURE — 87899 AGENT NOS ASSAY W/OPTIC: CPT

## 2024-05-31 PROCEDURE — 83880 ASSAY OF NATRIURETIC PEPTIDE: CPT

## 2024-05-31 PROCEDURE — 80053 COMPREHEN METABOLIC PANEL: CPT | Mod: 91

## 2024-05-31 PROCEDURE — 2500000004 HC RX 250 GENERAL PHARMACY W/ HCPCS (ALT 636 FOR OP/ED)

## 2024-05-31 PROCEDURE — 96365 THER/PROPH/DIAG IV INF INIT: CPT

## 2024-05-31 PROCEDURE — 74018 RADEX ABDOMEN 1 VIEW: CPT

## 2024-05-31 PROCEDURE — 99285 EMERGENCY DEPT VISIT HI MDM: CPT | Performed by: EMERGENCY MEDICINE

## 2024-05-31 PROCEDURE — 96375 TX/PRO/DX INJ NEW DRUG ADDON: CPT

## 2024-05-31 PROCEDURE — 93005 ELECTROCARDIOGRAM TRACING: CPT

## 2024-05-31 PROCEDURE — 96361 HYDRATE IV INFUSION ADD-ON: CPT

## 2024-05-31 PROCEDURE — 84484 ASSAY OF TROPONIN QUANT: CPT | Mod: 91

## 2024-05-31 RX ORDER — KETOROLAC TROMETHAMINE 15 MG/ML
15 INJECTION, SOLUTION INTRAMUSCULAR; INTRAVENOUS ONCE
Status: COMPLETED | OUTPATIENT
Start: 2024-05-31 | End: 2024-05-31

## 2024-05-31 RX ORDER — IPRATROPIUM BROMIDE AND ALBUTEROL SULFATE 2.5; .5 MG/3ML; MG/3ML
3 SOLUTION RESPIRATORY (INHALATION)
Status: COMPLETED | OUTPATIENT
Start: 2024-05-31 | End: 2024-05-31

## 2024-05-31 RX ORDER — OXYCODONE HYDROCHLORIDE 5 MG/1
10 TABLET ORAL EVERY 6 HOURS PRN
Status: DISCONTINUED | OUTPATIENT
Start: 2024-05-31 | End: 2024-06-01

## 2024-05-31 RX ORDER — LEVOTHYROXINE SODIUM 112 UG/1
112 TABLET ORAL
Status: DISCONTINUED | OUTPATIENT
Start: 2024-06-01 | End: 2024-05-31

## 2024-05-31 RX ORDER — LIDOCAINE 560 MG/1
1 PATCH PERCUTANEOUS; TOPICAL; TRANSDERMAL DAILY
Status: DISCONTINUED | OUTPATIENT
Start: 2024-05-31 | End: 2024-06-04 | Stop reason: HOSPADM

## 2024-05-31 RX ORDER — CEFTRIAXONE 2 G/50ML
2 INJECTION, SOLUTION INTRAVENOUS EVERY 24 HOURS
Status: DISCONTINUED | OUTPATIENT
Start: 2024-05-31 | End: 2024-06-03

## 2024-05-31 RX ORDER — OXYCODONE HYDROCHLORIDE 10 MG/1
1 TABLET ORAL EVERY 6 HOURS PRN
Status: ON HOLD | COMMUNITY
End: 2024-06-03 | Stop reason: SDUPTHER

## 2024-05-31 RX ORDER — ONDANSETRON HYDROCHLORIDE 2 MG/ML
4 INJECTION, SOLUTION INTRAVENOUS EVERY 6 HOURS PRN
Status: DISCONTINUED | OUTPATIENT
Start: 2024-05-31 | End: 2024-06-04 | Stop reason: HOSPADM

## 2024-05-31 RX ORDER — ENOXAPARIN SODIUM 100 MG/ML
40 INJECTION SUBCUTANEOUS EVERY 24 HOURS
Status: DISCONTINUED | OUTPATIENT
Start: 2024-05-31 | End: 2024-06-04 | Stop reason: HOSPADM

## 2024-05-31 RX ORDER — GABAPENTIN 250 MG/5ML
900 SOLUTION ORAL 3 TIMES DAILY
Status: DISCONTINUED | OUTPATIENT
Start: 2024-05-31 | End: 2024-05-31

## 2024-05-31 RX ORDER — GABAPENTIN 250 MG/5ML
900 SOLUTION ORAL 3 TIMES DAILY
Status: DISCONTINUED | OUTPATIENT
Start: 2024-05-31 | End: 2024-06-04 | Stop reason: HOSPADM

## 2024-05-31 RX ORDER — LEVOTHYROXINE SODIUM 112 UG/1
112 TABLET ORAL
Status: DISCONTINUED | OUTPATIENT
Start: 2024-06-01 | End: 2024-06-04 | Stop reason: HOSPADM

## 2024-05-31 RX ADMIN — ENOXAPARIN SODIUM 40 MG: 100 INJECTION SUBCUTANEOUS at 14:31

## 2024-05-31 RX ADMIN — IPRATROPIUM BROMIDE AND ALBUTEROL SULFATE 3 ML: .5; 3 SOLUTION RESPIRATORY (INHALATION) at 09:29

## 2024-05-31 RX ADMIN — AZITHROMYCIN 500 MG: 500 INJECTION, POWDER, LYOPHILIZED, FOR SOLUTION INTRAVENOUS at 08:45

## 2024-05-31 RX ADMIN — GABAPENTIN 900 MG: 250 SOLUTION ORAL at 22:46

## 2024-05-31 RX ADMIN — SODIUM CHLORIDE 1000 ML: 9 INJECTION, SOLUTION INTRAVENOUS at 10:43

## 2024-05-31 RX ADMIN — OXYCODONE HYDROCHLORIDE 10 MG: 5 TABLET ORAL at 23:03

## 2024-05-31 RX ADMIN — SODIUM CHLORIDE 1000 ML: 9 INJECTION, SOLUTION INTRAVENOUS at 09:23

## 2024-05-31 RX ADMIN — IPRATROPIUM BROMIDE AND ALBUTEROL SULFATE 3 ML: .5; 3 SOLUTION RESPIRATORY (INHALATION) at 09:24

## 2024-05-31 RX ADMIN — Medication 2 L/MIN: at 08:45

## 2024-05-31 RX ADMIN — ONDANSETRON 4 MG: 2 INJECTION INTRAMUSCULAR; INTRAVENOUS at 22:45

## 2024-05-31 RX ADMIN — CEFTRIAXONE SODIUM 2 G: 2 INJECTION, SOLUTION INTRAVENOUS at 14:31

## 2024-05-31 RX ADMIN — KETOROLAC TROMETHAMINE 15 MG: 15 INJECTION, SOLUTION INTRAMUSCULAR; INTRAVENOUS at 09:53

## 2024-05-31 RX ADMIN — GABAPENTIN 900 MG: 250 SOLUTION ORAL at 14:32

## 2024-05-31 RX ADMIN — LIDOCAINE 1 PATCH: 4 PATCH TOPICAL at 09:53

## 2024-05-31 ASSESSMENT — PAIN SCALES - GENERAL: PAINLEVEL_OUTOF10: 6

## 2024-05-31 NOTE — H&P
History Of Present Illness  Blane Sharma is a 59 y.o. 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  He reported shortness of breath while resting and exerting.  He reported history of night sweat.  He denied any history of fever or chills.  Reported history of left upper back chest pain punching 5 out of 10 increases with breathing.  He denied any history of abdominal pain nausea or vomiting.  He denied any history of urinary symptoms.  He denied any history of previous episode of shortness of breath like that before.  He denied any history of sick contact.  He uses PEG tube for nutrition, nothing per mouth.    Social history: No history of smoking drinking or illicit drug use.    Of note: Resented to the ED for the same complaint a left AMA 3 days ago.    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   -has chronic pain related to past surgery for his previous cancer  -Last saw ENT in 3/2023, will follow up again in 6 months. At this appointment, he showed no evidence of disease  -PEG tube exchanged periodically; ENT recommends this soon.   -Patient takes nothing in by mouth due to vocal cord paralysis.    PE:  /82 Comment: left arm  Pulse 76   Temp 35.6 °C (96.1 °F)   Resp 20   Ht 1.829 m (6')   Wt 90.7 kg (200 lb)   SpO2 94%   BMI 27.12 kg/m²      Gen: A&O NAD, on 2 L nasal cannula  HEENT: NCAT, EOMI, not icteric. External ears normal. No rhinorrhea. Moist mucous membranes.  Neck: Supple, full range of motion, no observable masses, No meningeal sign.  Lungs: Crackles bilaterally in upper and mid lung zones   CV:  RRR, no edema.  Abdomen: Soft, nondistended, No rebound tenderness, PEG tube in place no tenderness around it  MSK: No joint swelling, no redness.  Skin: No rashes, petechiae, lesions. Normal color per patient.  Neuro: Normal Gait, Grossly intact.  Psych: Appropriate for situation.    ED course:  VS /77, , Afebrile,  presented with 88% >> placed on 2L NC >> 98%  Na 132, k 4.8, CL 93, CREA 0.68, CA 8.9, MAG 2.09, HCO3 28  Lactate 2  AST 44, ALT 61, ALK ELIO 56, Tot caleb 0.5  WBC 17.1, HB 14.8,   INT 1.4, PT 15.7    PT 7.48, PCO2 45  COVID neg    Blood cx 5/31 pending  Blood cx 5/31 neg Day 2    EKG no ischemic changes:     Imagings:  Xray:  Impression:     1. Findings suggesting small bilateral pleural effusions.  2. Diffuse reticulonodular opacities are noted of the lungs that  correlates with findings on the previous CT chest. Differential  considerations include atypical diffuse pneumonia versus lymphangitic  spread of tumor.     CTA:  Impression:     No evidence of pulmonary embolism.      Patchy/nodular bilateral infiltrates with pleural effusions and  moderate lymphadenopathy. Differential includes pneumonia and  neoplastic/metastatic disease. Clinical correlation and short  interval follow-up after appropriate treatment recommended.      Emphysematous changes with central bronchial wall thickening and  tracheal debris/secretions.      Moderate atherosclerotic disease.    PEG tube in place.            Intervnetion:  Unasyn  Azithro  Duoneb  Toradol  2 L NS       Past Medical History  Past Medical History:   Diagnosis Date    Cellulitis, unspecified 04/06/2020    Cellulitis of skin    Cervicalgia 08/04/2017    Cervical pain    Disease of spinal cord, unspecified (Multi) 05/25/2022    Cervical myelopathy with cervical radiculopathy    Dysphagia, unspecified 03/11/2022    Dysphagia    Encounter for observation for other suspected diseases and conditions ruled out 09/16/2022    Observation  for suspected malignant neoplasm    Gastro-esophageal reflux disease without esophagitis 09/05/2014    GERD (gastroesophageal reflux disease)    Gastrostomy malfunction (Multi) 05/31/2019    Mechanical complication of gastrostomy    Hypothyroidism, unspecified 09/16/2022    Acquired hypothyroidism    Low back pain, unspecified 08/26/2015    Lumbago    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    Malignant neoplasm of glottis    Neoplasm of uncertain behavior, unspecified 05/12/2019    Neoplasm of uncertain behavior    Paralysis of vocal cords and larynx, unilateral 04/27/2022    Unilateral partial vocal cord paralysis    Personal history of other diseases of the respiratory system 06/09/2017    History of acute sinusitis    Personal history of other diseases of the respiratory system 01/21/2016    History of acute bronchitis    Personal history of other diseases of the respiratory system 06/09/2017    History of acute sinusitis    Radiation sickness, unspecified, initial encounter 09/05/2014    Complication of radiotherapy    Spinal stenosis, cervical region 10/09/2015    Cervical spinal stenosis       Surgical History  Past Surgical History:   Procedure Laterality Date    NECK SURGERY  01/21/2016    Neck Surgery    OTHER SURGICAL HISTORY  01/21/2016    Laryngeal Surgery    OTHER SURGICAL HISTORY  04/27/2022    Umbilical hernia repair laparoscopic    OTHER SURGICAL HISTORY  04/27/2022    Cholecystectomy        Social History  He reports that he quit smoking about 13 years ago. His smoking use included cigarettes. He has never used smokeless tobacco. He reports that he does not currently use alcohol. He reports that he does not currently use drugs.    Family History  Family History   Problem Relation Name Age of Onset    Iron deficiency Mother      Other (lung nodules) Mother      Cancer Other Family     Diabetes Other Family     Other (nonorganic sleep apnea) Other Family         Allergies  Bee pollen,  Codeine, Morphine, and Other         Assessment/Plan   Principal Problem:    Multifocal pneumonia    Blane Sharma is a 59 y.o. 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  He reported shortness of breath while resting and exerting.  He reported history of night sweat along with pleuritic chest pain.  Vital signs showed desaturation of 88% went up to 98 on 2 L nasal cannula.  Physical exam significant for crackles bilaterally in the upper and middle zones along with PEG tube in the abdomen without any significant concerns around it.  Labs were significant for leukocytosis along with mildly elevated ALT and AST.  Blood cultures 2 days ago when he presented to the ED was negative.  Given the history and physical exam, most likely community-acquired pneumonia secondary to an aspiration event.    #Community-acquired pneumonia  :: Patient presents shortness of breath over the last 3 weeks along with physical exam significant for hypoxia along with the PACs bilaterally, labs were significant for leukocytosis  -Will order strep pneumo, Legionella, MRSA  -Will start ceftriaxone 2 g daily given the background of malignancy  -Continue azithromycin for additional 2 days  -Will get abdominal x-ray with contrast for PEG tube to make sure it is functional  -Given the finding of bilateral effusion in the x-ray, will get an echo given no echo in the system .  -Will get nutrition consult for tube feed recommendations  -Blood cultures 5/31 pending    #T2 N0 lesion of his right posterior glottic larynx  -Continue with oxycodone  -Continue with gabapentin  -Follow-up with oncology as outpatient    # Hypothyroidism  -Continue with Synthroid    # Elevated LFTs  -Will monitor    F:PRN  E:K>4, MG>2  N: PEG tube, NPO, pending x  ray for PEG   A: PIV  DVT: Levonx  FULL CODE  NOK: KARINA PURVIS ([facility]) [phone]      Emmanuelle Alexandra MD

## 2024-05-31 NOTE — PROGRESS NOTES
"Pharmacy Medication History Review    Blane Sharma is a 59 y.o. male admitted for Multifocal pneumonia. Pharmacy reviewed the patient's bjzbc-pr-[id number] medications and allergies for accuracy.    The list below reflects the updated PTA list. Comments regarding how patient may be taking medications differently can be found in the Admit Orders Activity  Prior to Admission Medications   Prescriptions Last Dose Informant Patient Reported? Taking?   amoxicillin-pot clavulanate (Augmentin) 875-125 mg tablet   No No   Sig: Take 1 tablet by mouth every 12 hours for 7 days.   doxycycline (Vibramycin) 100 mg capsule   No No   Sig: Take 1 capsule (100 mg) by mouth 2 times a day for 10 days. Take with at least 8 ounces (large glass) of water, do not lie down for 30 minutes after   gabapentin 250 mg/5 mL solution   No No   Sig: Take 18 mL (900 mg) by mouth 3 times a day.   levothyroxine (Synthroid, Levoxyl) 112 mcg tablet   Yes No   Sig: Take 1 tablet (112 mcg) by mouth once daily in the morning. Take before meals.   methocarbamol (Robaxin) 500 mg tablet   No No   Sig: Take 1-2 tablets (500-1,000 mg) by mouth 2 times a day as needed for muscle spasms.   nut.tx.impaired digest/fiber (VITAL 1.5 BONY ORAL)   Yes No   Si oz by g-tube route 3 times a day.   oxyCODONE (Roxicodone) 10 mg immediate release tablet   Yes No   Si tablet (10 mg) every 6 hours if needed.      Facility-Administered Medications: None       The list below reflects the updated allergy list. Please review each documented allergy for additional clarification and justification.  Allergies  Reviewed by Renate Summers on 2024        Severity Reactions Comments    Bee Pollen High Anaphylaxis bee stings    Codeine Not Specified Nausea/vomiting     Morphine Not Specified Unknown     Other Not Specified Unknown \"Codeine Phosphate Powd\"            Patient was unable to be assessed for M2B at discharge. Pharmacy has been updated to out patient fill history, " OARRS, and patient interview      Sources used to complete the med history include out patient fill history, OARRS, and patient interview    Good historian, family at bedside assisted  ED note 5/31/2024  Palliative Medicine note 4/22/2024      Medications ADDED:  Oxycodone 10mg  Medications CHANGED:  N/a  Medications REMOVED:   Levothyroxine 75mcg  Ketoconazole 2% cream      Below are additional concerns with the patient's PTA list.  Patient is taking Levothyroxine 112mcg (last picked up at pharmacy 4/9/24 for 90 day supply)    Renate Summers PharmD  Transitions of Care Pharmacist  [facility]  Please reach out via Secure Chat for questions, or if no response call [facility] or [facility]

## 2024-05-31 NOTE — ED PROVIDER NOTES
"EMERGENCY DEPARTMENT ENCOUNTER      Pt Name: Blane Sharma  MRN: [id number]  Birthdate 1965  Date of evaluation: 5/31/2024  Provider: Nimesh Washington MD    CHIEF COMPLAINT       Chief Complaint   Patient presents with    Shortness of Breath         HISTORY OF PRESENT ILLNESS    59-year-old male with history of laryngeal cancer in remission since 2011 and remote 15-pack-year smoking history presenting to the ED with a complaint of worsening shortness of breath.  Patient reports presenting to the ED 3 days ago for shortness of breath in which has been worsening since he left [facility].  He was prescribed Augmentin & doxycycline which he has been taking.  States that he is having inability to breathe. Endorses new pleuritic left sided mid back pain up to a 9/10 as if someone \"punched him in the back\" since yesterday. Also endorses feeling feverish at home but did not take his temperature, palpitations, night sweats, and lightheadedness. Cough with yellow production for 2 weeks. Denies trauma, nausea, vomiting, urinary symptoms, changes in vision, rash, or blood in urine or stool.      History provided by:  Patient      Nursing Notes were reviewed.    PAST MEDICAL HISTORY     Past Medical History:   Diagnosis Date    Cellulitis, unspecified 04/06/2020    Cellulitis of skin    Cervicalgia 08/04/2017    Cervical pain    Disease of spinal cord, unspecified (Multi) 05/25/2022    Cervical myelopathy with cervical radiculopathy    Dysphagia, unspecified 03/11/2022    Dysphagia    Encounter for observation for other suspected diseases and conditions ruled out 09/16/2022    Observation for suspected malignant neoplasm    Gastro-esophageal reflux disease without esophagitis 09/05/2014    GERD (gastroesophageal reflux disease)    Gastrostomy malfunction (Multi) 05/31/2019    Mechanical complication of gastrostomy    Hypothyroidism, unspecified 09/16/2022    Acquired hypothyroidism    Low back pain, unspecified 08/26/2015    Lumbago    " 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    Malignant neoplasm of glottis    Neoplasm of uncertain behavior, unspecified 05/12/2019    Neoplasm of uncertain behavior    Paralysis of vocal cords and larynx, unilateral 04/27/2022    Unilateral partial vocal cord paralysis    Personal history of other diseases of the respiratory system 06/09/2017    History of acute sinusitis    Personal history of other diseases of the respiratory system 01/21/2016    History of acute bronchitis    Personal history of other diseases of the respiratory system 06/09/2017    History of acute sinusitis    Radiation sickness, unspecified, initial encounter 09/05/2014    Complication of radiotherapy    Spinal stenosis, cervical region 10/09/2015    Cervical spinal stenosis         SURGICAL HISTORY       Past Surgical History:   Procedure Laterality Date    NECK SURGERY  01/21/2016    Neck Surgery    OTHER SURGICAL HISTORY  01/21/2016    Laryngeal Surgery    OTHER SURGICAL HISTORY  04/27/2022    Umbilical hernia repair laparoscopic    OTHER SURGICAL HISTORY  04/27/2022    Cholecystectomy         CURRENT MEDICATIONS       Previous Medications    AMOXICILLIN-POT CLAVULANATE (AUGMENTIN) 875-125 MG TABLET    Take 1 tablet by mouth every 12 hours for 7 days.    DOXYCYCLINE (VIBRAMYCIN) 100 MG CAPSULE    Take 1 capsule (100 mg) by mouth 2 times a day for 10 days. Take with at least 8 ounces (large glass) of water, do not lie down for 30 minutes after    GABAPENTIN 250 MG/5 ML SOLUTION    Take 18 mL (900 mg) by mouth 3 times a day.    KETOCONAZOLE (NIZORAL) 2 % CREAM    twice a day.    LEVOTHYROXINE (SYNTHROID, LEVOXYL) 112 MCG TABLET    Take 1 tablet (112 mcg) by mouth once daily in the morning. Take before meals.    LEVOTHYROXINE (SYNTHROID, LEVOXYL) 75 MCG TABLET    1 tablet (75 mcg) by g-tube route once daily.    METHOCARBAMOL (ROBAXIN) 500 MG TABLET    Take 1-2 tablets (500-1,000 mg) by mouth 2 times a day as needed for muscle spasms.     NUT.TX.IMPAIRED DIGEST/FIBER (VITAL 1.5 BONY ORAL)    16 oz by g-tube route 3 times a day.       ALLERGIES     Bee pollen, Codeine, Morphine, and Other    FAMILY HISTORY       Family History   Problem Relation Name Age of Onset    Iron deficiency Mother      Other (lung nodules) Mother      Cancer Other Family     Diabetes Other Family     Other (nonorganic sleep apnea) Other Family           SOCIAL HISTORY       Social History     Socioeconomic History    Marital status:      Spouse name: Not on file    Number of children: Not on file    Years of education: Not on file    Highest education level: Not on file   Occupational History    Not on file   Tobacco Use    Smoking status: Former     Current packs/day: 0.00     Types: Cigarettes     Quit date:      Years since quittin.4    Smokeless tobacco: Never   Vaping Use    Vaping status: Never Used   Substance and Sexual Activity    Alcohol use: Not Currently    Drug use: Not Currently    Sexual activity: Not on file   Other Topics Concern    Not on file   Social History Narrative    Not on file     Social Determinants of Health     Financial Resource Strain: Not on file   Food Insecurity: Not on file   Transportation Needs: Not on file   Physical Activity: Not on file   Stress: Not on file   Social Connections: Not on file   Intimate Partner Violence: Not on file   Housing Stability: Not on file       SCREENINGS                        PHYSICAL EXAM    (up to 7 for level 4, 8 or more for level 5)     ED Triage Vitals [24 0756]   Temperature Heart Rate Respirations BP   35.6 °C (96.1 °F) (!) 107 20 125/77      Pulse Ox Temp src Heart Rate Source Patient Position   (!) 88 % -- -- --      BP Location FiO2 (%)     -- --       Physical Exam  Vitals and nursing note reviewed.   Constitutional:       General: He is not in acute distress.     Appearance: He is well-developed.   HENT:      Head: Normocephalic and atraumatic.      Right Ear: External ear  normal.      Left Ear: External ear normal.      Nose: Nose normal. No congestion or rhinorrhea.      Mouth/Throat:      Mouth: Mucous membranes are moist.      Pharynx: No oropharyngeal exudate or posterior oropharyngeal erythema.      Comments: Hoarse voice  Eyes:      General:         Right eye: No discharge.         Left eye: No discharge.      Extraocular Movements: Extraocular movements intact.      Conjunctiva/sclera: Conjunctivae normal.   Cardiovascular:      Rate and Rhythm: Normal rate and regular rhythm.      Pulses: Normal pulses.      Heart sounds: No murmur heard.     No friction rub.   Pulmonary:      Effort: Pulmonary effort is normal. No respiratory distress.      Breath sounds: No stridor. Examination of the right-middle field reveals rhonchi. Examination of the left-middle field reveals rhonchi. Examination of the right-lower field reveals rhonchi. Examination of the left-lower field reveals rhonchi. Rhonchi present. No wheezing or rales.   Abdominal:      General: There is no distension.      Palpations: Abdomen is soft.      Tenderness: There is no abdominal tenderness. There is no right CVA tenderness, left CVA tenderness or guarding.   Musculoskeletal:         General: No swelling, deformity or signs of injury. Normal range of motion.      Cervical back: Neck supple.      Thoracic back: Tenderness (left mid back) present.      Right lower leg: No edema.      Left lower leg: No edema.   Skin:     General: Skin is warm and dry.      Capillary Refill: Capillary refill takes less than 2 seconds.      Coloration: Skin is not jaundiced or pale.      Findings: No lesion or rash.   Neurological:      General: No focal deficit present.      Mental Status: He is alert. Mental status is at baseline.      Cranial Nerves: No cranial nerve deficit.      Sensory: No sensory deficit.      Motor: No weakness.   Psychiatric:         Mood and Affect: Mood normal.         Behavior: Behavior normal.          Thought Content: Thought content normal.         Judgment: Judgment normal.          DIAGNOSTIC RESULTS     LABS:  Labs Reviewed   CBC WITH AUTO DIFFERENTIAL - Abnormal       Result Value    WBC 17.1 (*)     nRBC 0.0      RBC 4.96      Hemoglobin 14.8      Hematocrit 42.6      MCV 86      MCH 29.8      MCHC 34.7      RDW 13.6      Platelets 451 (*)     Neutrophils % 84.1      Immature Granulocytes %, Automated 0.6      Lymphocytes % 8.4      Monocytes % 5.4      Eosinophils % 0.9      Basophils % 0.6      Neutrophils Absolute 14.40 (*)     Immature Granulocytes Absolute, Automated 0.10      Lymphocytes Absolute 1.44      Monocytes Absolute 0.93      Eosinophils Absolute 0.15      Basophils Absolute 0.10     COMPREHENSIVE METABOLIC PANEL - Abnormal    Glucose 192 (*)     Sodium 132 (*)     Potassium 4.8      Chloride 93 (*)     Bicarbonate 28      Anion Gap 16      Urea Nitrogen 13      Creatinine 0.68      eGFR >90      Calcium 8.9      Albumin 3.3 (*)     Alkaline Phosphatase 56      Total Protein 8.8 (*)     AST 44 (*)     Bilirubin, Total 0.5      ALT 61 (*)    COAGULATION SCREEN - Abnormal    Protime 15.7 (*)     INR 1.4 (*)     aPTT 33      Narrative:     The APTT is no longer used for monitoring Unfractionated Heparin Therapy. For monitoring Heparin Therapy, use the Heparin Assay.   BLOOD GAS VENOUS FULL PANEL - Abnormal    POCT pH, Venous 7.48 (*)     POCT pCO2, Venous 45      POCT pO2, Venous 53 (*)     POCT SO2, Venous 78 (*)     POCT Oxy Hemoglobin, Venous 76.4 (*)     POCT Hematocrit Calculated, Venous 45.0      POCT Sodium, Venous 129 (*)     POCT Potassium, Venous 4.7      POCT Chloride, Venous 96 (*)     POCT Ionized Calicum, Venous 1.16      POCT Glucose, Venous 225 (*)     POCT Lactate, Venous 2.0      POCT Base Excess, Venous 8.7 (*)     POCT HCO3 Calculated, Venous 33.5 (*)     POCT Hemoglobin, Venous 15.1      POCT Anion Gap, Venous 4.0 (*)     Patient Temperature 37.0      FiO2 21     SARS-COV-2  PCR - Normal    Coronavirus 2019, PCR Not Detected      Narrative:     This assay has received FDA Emergency Use Authorization (EUA) and is only authorized for the duration of time that circumstances exist to justify the authorization of the emergency use of in vitro diagnostic tests for the detection of SARS-CoV-2 virus and/or diagnosis of COVID-19 infection under section 564(b)(1) of the Act, 21 U.S.C. 360bbb-3(b)(1). This assay is an in vitro diagnostic nucleic acid amplification test for the qualitative detection of SARS-CoV-2 from nasopharyngeal specimens and has been validated for use at [facility]. Negative results do not preclude COVID-19 infections and should not be used as the sole basis for diagnosis, treatment, or other management decisions.     MAGNESIUM - Normal    Magnesium 2.09     LACTATE - Normal    Lactate 1.8      Narrative:     Venipuncture immediately after or during the administration of Metamizole may lead to falsely low results. Testing should be performed immediately  prior to Metamizole dosing.   B-TYPE NATRIURETIC PEPTIDE - Normal    BNP 86      Narrative:        <100 pg/mL - Heart failure unlikely  100-299 pg/mL - Intermediate probability of acute heart                  failure exacerbation. Correlate with clinical                  context and patient history.    >=300 pg/mL - Heart Failure likely. Correlate with clinical                  context and patient history.     Biotin interference may cause falsely decreased results. Patients taking a Biotin dose of up to 5 mg/day should refrain from taking Biotin for 24 hours before sample  collection. Providers may contact their local laboratory for further information.   SERIAL TROPONIN-INITIAL - Normal    Troponin I, High Sensitivity 4      Narrative:     Less than 99th percentile of normal range cutoff-  Female and children under 18 years old <35 ng/L; Male <54 ng/L: Negative  Repeat testing should be performed if  clinically indicated.     Female and children under 18 years old  ng/L; Male  ng/L:  Consistent with possible cardiac damage and possible increased clinical   risk. Serial measurements may help to assess extent of myocardial damage.     >120 ng/L: Consistent with cardiac damage, increased clinical risk and  myocardial infarction. Serial measurements may help assess extent of   myocardial damage.      NOTE: Children less than 1 year old may have higher baseline troponin   levels and results should be interpreted in conjunction with the overall   clinical context.    NOTE: Troponin I testing is performed using a different   testing methodology at [facility] than at other   [facility]. Direct result comparisons should only   be made within the same method.     BLOOD CULTURE   BLOOD CULTURE   TROPONIN SERIES- (INITIAL, 1 HR)    Narrative:     The following orders were created for panel order Troponin Series, (0, 1 HR).  Procedure                               Abnormality         Status                     ---------                               -----------         ------                     Troponin I, High Sensiti...[[id number]]  Normal              Final result               Troponin, High Sensitivi...[[id number]]                                                   Please view results for these tests on the individual orders.   SERIAL TROPONIN, 1 HOUR       All other labs were within normal range or not returned as of this dictation.    Imaging  XR chest 2 views   Final Result   1. Findings suggesting small bilateral pleural effusions.   2. Diffuse reticulonodular opacities are noted of the lungs that   correlates with findings on the previous CT chest. Differential   considerations include atypical diffuse pneumonia versus lymphangitic   spread of tumor.        MACRO:   None.        Signed by: Brooke Rivas 5/31/2024 9:48 AM   Dictation workstation:   [id number]            Procedures  Procedures     EMERGENCY DEPARTMENT COURSE/MDM:     ED Course as of 05/31/24 1016   Fri May 31, 2024   0855 EKG Interpretation: Rate: 92. Rhythm: Sinus.  NAD.  No delta-wave, ELSA, deep TWI or biphasic t-waves in V1-V5, or LBBB. QTc: not prolonged at 452 ms. [ES]   0900 BLOOD GAS VENOUS FULL PANEL(!)  Metabolic alkalosis [ES]   0937 BP 90's/60's MAP 72. Remaining fluid pressure-bagged and additional 1L NS provided. [ES]      ED Course User Index  [ES] Nimesh Washington MD         Diagnoses as of 05/31/24 1016   Multifocal pneumonia   Sepsis without acute organ dysfunction, due to unspecified organism (Multi)          Medical Decision Making  59-year-old male with history of laryngeal cancer in remission since 2011 and remote 15-pack-year smoking history presenting to the ED with a complaint of worsening shortness of breath.  Patient is tachycardic and hypoxic on room air at 88% but otherwise afebrile, normotensive, no increased work of breathing, and in no acute distress.  O2 saturation can be related to possible undiagnosed COPD however patient states he has never required oxygen in the past.  Sepsis workup started and patient provided medicine  After reviewing patient's CT angio chest 3 days ago, there are multifocal densities concerning for plugging.  We will cover with Unasyn and azithromycin for anaerobic coverage due to appearance of plugging.  DuoNebs provided to assist in lung clearance and with the patient's smoking history.    Sepsis-screening criteria    Possible source: [Pneumonia]  SIRS criteria:  [] Temp >38.0C  [] Temp <36C  [X] HR >90  [] RR >20  [X] WBC >12,000  [] WBC<4,000  [] >10% bands  ----  Organ Dysfunction (any 1)  [] SBP <90, or drop by 40mm Hg from baseline  [] MAP <65  [] Creatinine >2  [] UO< 0.5 ml/kg/hr/2hr  [] Bilirubin >2  [] INR> 1.5  [] aPTT >60sec  [] Lactate >2  [] Resp Failure  [] Altered mental status/CNS dysfunction  ----  Within 3 hours  [] Lactate level, 2nd lactic  acid ordered  [X] Blood cx prior to Antibx  [X] Antibiotics, Broad spectrum Initiated  [] Fluids 30cc/kg    Tissue Reassessment: Time [1000]  [X] I have reassessed the patient´s hemodynamic status or tissue perfusion after bolus given.  This was via a repeat assessment of patient's vital signs, skin perfusion, capillary refill, pulses, and mental status.    Used order set: yes [X]     no [ ] - why?    Labs reveal leukocytosis with presence of tachycardia and pleural effusions on CXR suggestive of persistent pneumonia.  Patient is saturating well on 2 L nasal cannula to 97% and tolerated decreased to 1 L after DuoNeb treatments.  Patient admitted to medicine service for sepsis pneumonia.        Patient and or family in agreement and understanding of treatment plan.  All questions answered.      I reviewed the case with the attending ED physician. The attending ED physician agrees with the plan. Patient and/or patient´s representative was counseled regarding labs, imaging, likely diagnosis, and plan. All questions were answered.    ED Medications administered this visit:    Medications   ampicillin-sulbactam (Unasyn) in sodium chloride 0.9 % 100 mL 3 g (has no administration in time range)   oxygen (O2) therapy (2 L/min inhalation Start 5/31/24 0845)   sodium chloride 0.9 % bolus 1,000 mL (1,000 mL intravenous New Bag 5/31/24 0923)   lidocaine 4 % patch 1 patch (1 patch transdermal Medication Applied 5/31/24 0953)   sodium chloride 0.9 % bolus 1,000 mL (has no administration in time range)   azithromycin (Zithromax) in dextrose 5 % in water (D5W) 250 mL  mg (500 mg intravenous New Bag 5/31/24 0845)   ipratropium-albuteroL (Duo-Neb) 0.5-2.5 mg/3 mL nebulizer solution 3 mL (3 mL nebulization Given 5/31/24 0929)   ketorolac (Toradol) injection 15 mg (15 mg intravenous Given 5/31/24 0953)       New Prescriptions from this visit:    New Prescriptions    No medications on file       Follow-up:  No follow-up provider  specified.      Final Impression:   1. Multifocal pneumonia    2. Sepsis without acute organ dysfunction, due to unspecified organism (Multi)          (Please note that portions of this note were completed with a voice recognition program.  Efforts were made to edit the dictations but occasionally words are mis-transcribed.)     Nimesh Washington MD  Resident  05/31/24 1014       Nimesh Washington MD  Resident  05/31/24 1016

## 2024-05-31 NOTE — ED TRIAGE NOTES
SOB x2 days. Voice is hoarse and reports productive cough, fever, chills. Hx of partial vocal cord paralysis and CA of glottis in 2011. Pt resp equal and unlabored. Spo2 88% after ambulation, 91% at rest.

## 2024-06-01 ENCOUNTER — APPOINTMENT (OUTPATIENT)
Dept: CARDIOLOGY | Facility: HOSPITAL | Age: 59
DRG: 194 | End: 2024-06-01
Payer: COMMERCIAL

## 2024-06-01 LAB
ATRIAL RATE: 107 BPM
BACTERIA BLD CULT: NORMAL
BACTERIA BLD CULT: NORMAL
EJECTION FRACTION APICAL 4 CHAMBER: 72.8
LEGIONELLA AG UR QL: NEGATIVE
LV EJECTION FRACTION BIPLANE: 71 %
MITRAL VALVE E/A RATIO: 0.83
P AXIS: 67 DEGREES
P OFFSET: 211 MS
P ONSET: 159 MS
PR INTERVAL: 128 MS
Q ONSET: 223 MS
QRS COUNT: 17 BEATS
QRS DURATION: 82 MS
QT INTERVAL: 356 MS
QTC CALCULATION(BAZETT): 475 MS
QTC FREDERICIA: 431 MS
R AXIS: 71 DEGREES
RIGHT VENTRICLE FREE WALL PEAK S': 11.3 CM/S
S PNEUM AG UR QL: NEGATIVE
T AXIS: 53 DEGREES
T OFFSET: 401 MS
TRICUSPID ANNULAR PLANE SYSTOLIC EXCURSION: 2.5 CM
VENTRICULAR RATE: 107 BPM

## 2024-06-01 PROCEDURE — 1210000001 HC SEMI-PRIVATE ROOM DAILY

## 2024-06-01 PROCEDURE — 93325 DOPPLER ECHO COLOR FLOW MAPG: CPT

## 2024-06-01 PROCEDURE — 2500000005 HC RX 250 GENERAL PHARMACY W/O HCPCS

## 2024-06-01 PROCEDURE — 2500000004 HC RX 250 GENERAL PHARMACY W/ HCPCS (ALT 636 FOR OP/ED): Performed by: STUDENT IN AN ORGANIZED HEALTH CARE EDUCATION/TRAINING PROGRAM

## 2024-06-01 PROCEDURE — 99232 SBSQ HOSP IP/OBS MODERATE 35: CPT | Performed by: STUDENT IN AN ORGANIZED HEALTH CARE EDUCATION/TRAINING PROGRAM

## 2024-06-01 PROCEDURE — 2500000004 HC RX 250 GENERAL PHARMACY W/ HCPCS (ALT 636 FOR OP/ED)

## 2024-06-01 PROCEDURE — 2500000001 HC RX 250 WO HCPCS SELF ADMINISTERED DRUGS (ALT 637 FOR MEDICARE OP)

## 2024-06-01 PROCEDURE — 2500000001 HC RX 250 WO HCPCS SELF ADMINISTERED DRUGS (ALT 637 FOR MEDICARE OP): Performed by: STUDENT IN AN ORGANIZED HEALTH CARE EDUCATION/TRAINING PROGRAM

## 2024-06-01 RX ORDER — OXYCODONE HYDROCHLORIDE 5 MG/1
5 TABLET ORAL EVERY 6 HOURS PRN
Status: DISCONTINUED | OUTPATIENT
Start: 2024-06-01 | End: 2024-06-04 | Stop reason: HOSPADM

## 2024-06-01 RX ADMIN — ONDANSETRON 4 MG: 2 INJECTION INTRAMUSCULAR; INTRAVENOUS at 19:07

## 2024-06-01 RX ADMIN — PERFLUTREN 3 ML OF DILUTION: 6.52 INJECTION, SUSPENSION INTRAVENOUS at 10:25

## 2024-06-01 RX ADMIN — AZITHROMYCIN 500 MG: 500 INJECTION, POWDER, LYOPHILIZED, FOR SOLUTION INTRAVENOUS at 09:26

## 2024-06-01 RX ADMIN — Medication 2 L/MIN: at 20:00

## 2024-06-01 RX ADMIN — GABAPENTIN 900 MG: 250 SOLUTION ORAL at 16:09

## 2024-06-01 RX ADMIN — Medication 2 L/MIN: at 08:00

## 2024-06-01 RX ADMIN — LEVOTHYROXINE SODIUM 112 MCG: 112 TABLET ORAL at 12:27

## 2024-06-01 RX ADMIN — GABAPENTIN 900 MG: 250 SOLUTION ORAL at 09:26

## 2024-06-01 RX ADMIN — GABAPENTIN 900 MG: 250 SOLUTION ORAL at 21:29

## 2024-06-01 RX ADMIN — OXYCODONE HYDROCHLORIDE 5 MG: 5 TABLET ORAL at 09:41

## 2024-06-01 RX ADMIN — CEFTRIAXONE SODIUM 2 G: 2 INJECTION, SOLUTION INTRAVENOUS at 12:58

## 2024-06-01 RX ADMIN — ENOXAPARIN SODIUM 40 MG: 100 INJECTION SUBCUTANEOUS at 13:03

## 2024-06-01 SDOH — SOCIAL STABILITY: SOCIAL INSECURITY: WERE YOU ABLE TO COMPLETE ALL THE BEHAVIORAL HEALTH SCREENINGS?: YES

## 2024-06-01 SDOH — SOCIAL STABILITY: SOCIAL INSECURITY: ARE YOU OR HAVE YOU BEEN THREATENED OR ABUSED PHYSICALLY, EMOTIONALLY, OR SEXUALLY BY ANYONE?: NO

## 2024-06-01 SDOH — SOCIAL STABILITY: SOCIAL INSECURITY: HAVE YOU HAD THOUGHTS OF HARMING ANYONE ELSE?: NO

## 2024-06-01 SDOH — SOCIAL STABILITY: SOCIAL INSECURITY: HAS ANYONE EVER THREATENED TO HURT YOUR FAMILY OR YOUR PETS?: NO

## 2024-06-01 SDOH — SOCIAL STABILITY: SOCIAL INSECURITY: ABUSE: ADULT

## 2024-06-01 SDOH — SOCIAL STABILITY: SOCIAL INSECURITY: ARE THERE ANY APPARENT SIGNS OF INJURIES/BEHAVIORS THAT COULD BE RELATED TO ABUSE/NEGLECT?: NO

## 2024-06-01 SDOH — SOCIAL STABILITY: SOCIAL INSECURITY: DO YOU FEEL ANYONE HAS EXPLOITED OR TAKEN ADVANTAGE OF YOU FINANCIALLY OR OF YOUR PERSONAL PROPERTY?: NO

## 2024-06-01 SDOH — SOCIAL STABILITY: SOCIAL INSECURITY: DOES ANYONE TRY TO KEEP YOU FROM HAVING/CONTACTING OTHER FRIENDS OR DOING THINGS OUTSIDE YOUR HOME?: NO

## 2024-06-01 SDOH — SOCIAL STABILITY: SOCIAL INSECURITY: HAVE YOU HAD ANY THOUGHTS OF HARMING ANYONE ELSE?: NO

## 2024-06-01 SDOH — SOCIAL STABILITY: SOCIAL INSECURITY: DO YOU FEEL UNSAFE GOING BACK TO THE PLACE WHERE YOU ARE LIVING?: NO

## 2024-06-01 ASSESSMENT — COGNITIVE AND FUNCTIONAL STATUS - GENERAL
MOVING TO AND FROM BED TO CHAIR: A LITTLE
CLIMB 3 TO 5 STEPS WITH RAILING: A LITTLE
MOBILITY SCORE: 24
DAILY ACTIVITIY SCORE: 24
EATING MEALS: A LITTLE
MOBILITY SCORE: 24
DAILY ACTIVITIY SCORE: 23
MOBILITY SCORE: 24
MOBILITY SCORE: 21
DAILY ACTIVITIY SCORE: 24
DAILY ACTIVITIY SCORE: 24
PATIENT BASELINE BEDBOUND: NO
STANDING UP FROM CHAIR USING ARMS: A LITTLE

## 2024-06-01 ASSESSMENT — PAIN SCALES - GENERAL
PAINLEVEL_OUTOF10: 0 - NO PAIN

## 2024-06-01 ASSESSMENT — ACTIVITIES OF DAILY LIVING (ADL)
BATHING: INDEPENDENT
LACK_OF_TRANSPORTATION: NO
ADEQUATE_TO_COMPLETE_ADL: YES
DRESSING YOURSELF: INDEPENDENT
JUDGMENT_ADEQUATE_SAFELY_COMPLETE_DAILY_ACTIVITIES: YES
ASSISTIVE_DEVICE: EYEGLASSES
HEARING - RIGHT EAR: FUNCTIONAL
PATIENT'S MEMORY ADEQUATE TO SAFELY COMPLETE DAILY ACTIVITIES?: YES
WALKS IN HOME: INDEPENDENT
HEARING - LEFT EAR: FUNCTIONAL
FEEDING YOURSELF: DEPENDENT
GROOMING: INDEPENDENT
TOILETING: INDEPENDENT

## 2024-06-01 ASSESSMENT — LIFESTYLE VARIABLES
HOW OFTEN DO YOU HAVE A DRINK CONTAINING ALCOHOL: NEVER
HOW MANY STANDARD DRINKS CONTAINING ALCOHOL DO YOU HAVE ON A TYPICAL DAY: PATIENT DOES NOT DRINK
HOW OFTEN DO YOU HAVE 6 OR MORE DRINKS ON ONE OCCASION: NEVER
AUDIT-C TOTAL SCORE: 0
AUDIT-C TOTAL SCORE: 0
SKIP TO QUESTIONS 9-10: 1

## 2024-06-01 ASSESSMENT — PATIENT HEALTH QUESTIONNAIRE - PHQ9
SUM OF ALL RESPONSES TO PHQ9 QUESTIONS 1 & 2: 0
1. LITTLE INTEREST OR PLEASURE IN DOING THINGS: NOT AT ALL
2. FEELING DOWN, DEPRESSED OR HOPELESS: NOT AT ALL

## 2024-06-01 ASSESSMENT — PAIN - FUNCTIONAL ASSESSMENT
PAIN_FUNCTIONAL_ASSESSMENT: 0-10
PAIN_FUNCTIONAL_ASSESSMENT: 0-10

## 2024-06-01 NOTE — PROGRESS NOTES
Blane Sharma is a 59 y.o. male on hospital day 1 of admission presenting with Multifocal pneumonia.    Subjective     Denies SOB at this time. Will restart tube feeds.    Objective     GENERAL APPEARANCE: A&Ox3, appears in no acute distress  HEAD: normocephalic, atraumatic  THROAT: Oral cavity and pharynx normal. No inflammation, swelling, exudate, or lesions.  NECK: Neck supple, non-tender without lymphadenopathy, masses or thyromegaly.  CARDIAC: Normal S1 and S2. No S3, S4 or murmurs. Rhythm is regular. There is no peripheral edema, cyanosis or pallor. Extremities are warm and well perfused. No carotid bruits.  LUNGS: Clear to auscultation bilaterally without rales, rhonchi, wheezing or diminished breath sounds.  ABDOMEN: Positive bowel sounds. Soft, nondistended, nontender. No guarding or rebound. No masses.  EXTREMITIES: No significant deformity or joint abnormality. No edema. Peripheral pulses intact. No varicosities.  SKIN: Skin normal color, texture and turgor with no lesions or rash  PSYCHIATRIC: oriented to person, place, and time, good judgement and reason, without hallucinations, abnormal affect or abnormal behaviors during the examination. Patient is not suicidal.        Last Recorded Vitals  Blood pressure (!) 114/92, pulse 88, temperature 35.6 °C (96.1 °F), resp. rate 16, height 1.829 m (6'), weight 90.7 kg (200 lb), SpO2 97%.  Intake/Output last 3 Shifts:  No intake/output data recorded.    Relevant Results  Lab Results   Component Value Date    WBC 17.1 (H) 05/31/2024    HGB 14.8 05/31/2024    HCT 42.6 05/31/2024    MCV 86 05/31/2024     (H) 05/31/2024      Lab Results   Component Value Date    GLUCOSE 192 (H) 05/31/2024    CALCIUM 8.9 05/31/2024     (L) 05/31/2024    K 4.8 05/31/2024    CO2 28 05/31/2024    CL 93 (L) 05/31/2024    BUN 13 05/31/2024    CREATININE 0.68 05/31/2024     Scheduled medications  azithromycin, 500 mg, intravenous, q24h  cefTRIAXone, 2 g, intravenous,  q24h  enoxaparin, 40 mg, subcutaneous, q24h  gabapentin, 900 mg, g-tube, TID  levothyroxine, 112 mcg, g-tube, Daily before breakfast  lidocaine, 1 patch, transdermal, Daily  oxygen, , inhalation, Continuous - Inhalation      Continuous medications     PRN medications  PRN medications: ondansetron, oxyCODONE    Assessment/Plan     Principal Problem:    Multifocal pneumonia     Blane Sharma is a 59 y.o. 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  He reported shortness of breath while resting and exerting.  He reported history of night sweat along with pleuritic chest pain.  Vital signs showed desaturation of 88% went up to 98 on 2 L nasal cannula.  Physical exam significant for crackles bilaterally in the upper and middle zones along with PEG tube in the abdomen without any significant concerns around it.  Labs were significant for leukocytosis along with mildly elevated ALT and AST.  Blood cultures 2 days ago when he presented to the ED was negative.  Given the history and physical exam, most likely community-acquired pneumonia secondary to an aspiration event.     Community-acquired pneumonia  - Patient presents shortness of breath over the last 3 weeks along with physical exam significant for hypoxia along with the PACs bilaterally, labs were significant for leukocytosis  - Will order strep pneumo, Legionella, MRSA  - Will start ceftriaxone 2 g daily given the background of malignancy  - Continue azithromycin, day 2  - abdominal x-ray with contrast for PEG tube showed working PEG with no contrast extravasation   - Given the finding of bilateral effusion in the x-ray, will get an echo given no echo in the system .  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 PNA treatment, likely discharge in the next 1-2 days    Mark Davis MD     The patient encounter includes all but not limited to; Evaluation of laboratory results, pertinent imaging, and vital signs. Daily updates are discussed with any consulting services and family/medical power of  as needed. The patient's discharge  process begins at admission and daily contact with the patient's TCC and SW is pertinent in their efficient and safe discharge.

## 2024-06-01 NOTE — PROGRESS NOTES
Pharmacy Admission Order Reconciliation Review    Blane Sharma is a 59 y.o. male admitted for Multifocal pneumonia. Pharmacy reviewed the patient's unreconciled admission medications.    Prior to admission medications that were reviewed and acted on by the pharmacist include:  Augmentin  Doxycycline  Digestive/fiber supplement    These medications have been reconciled.     Any other unreconcilied medications have been addressed and will be ordered or held by the patient's medical team. Medications addressed by the pharmacist may be added or changed by the patient's medical team at any time.    Mima Moy, PharmD  Transitions of Care Pharmacist  [facility]  Please reach out via Secure Chat for questions

## 2024-06-02 LAB
ALBUMIN SERPL BCP-MCNC: 3 G/DL (ref 3.4–5)
ANION GAP SERPL CALC-SCNC: 13 MMOL/L (ref 10–20)
BUN SERPL-MCNC: 10 MG/DL (ref 6–23)
CALCIUM SERPL-MCNC: 9.1 MG/DL (ref 8.6–10.6)
CHLORIDE SERPL-SCNC: 98 MMOL/L (ref 98–107)
CO2 SERPL-SCNC: 30 MMOL/L (ref 21–32)
CREAT SERPL-MCNC: 0.7 MG/DL (ref 0.5–1.3)
EGFRCR SERPLBLD CKD-EPI 2021: >90 ML/MIN/1.73M*2
ERYTHROCYTE [DISTWIDTH] IN BLOOD BY AUTOMATED COUNT: 13.4 % (ref 11.5–14.5)
GLUCOSE BLD MANUAL STRIP-MCNC: 133 MG/DL (ref 74–99)
GLUCOSE SERPL-MCNC: 149 MG/DL (ref 74–99)
HCT VFR BLD AUTO: 43.1 % (ref 41–52)
HGB BLD-MCNC: 14.1 G/DL (ref 13.5–17.5)
MCH RBC QN AUTO: 28.8 PG (ref 26–34)
MCHC RBC AUTO-ENTMCNC: 32.7 G/DL (ref 32–36)
MCV RBC AUTO: 88 FL (ref 80–100)
NRBC BLD-RTO: 0 /100 WBCS (ref 0–0)
PHOSPHATE SERPL-MCNC: 3.5 MG/DL (ref 2.5–4.9)
PLATELET # BLD AUTO: 480 X10*3/UL (ref 150–450)
POTASSIUM SERPL-SCNC: 3.7 MMOL/L (ref 3.5–5.3)
RBC # BLD AUTO: 4.89 X10*6/UL (ref 4.5–5.9)
SODIUM SERPL-SCNC: 137 MMOL/L (ref 136–145)
STAPHYLOCOCCUS SPEC CULT: NORMAL
WBC # BLD AUTO: 12.3 X10*3/UL (ref 4.4–11.3)

## 2024-06-02 PROCEDURE — 99233 SBSQ HOSP IP/OBS HIGH 50: CPT | Performed by: STUDENT IN AN ORGANIZED HEALTH CARE EDUCATION/TRAINING PROGRAM

## 2024-06-02 PROCEDURE — 36415 COLL VENOUS BLD VENIPUNCTURE: CPT | Performed by: STUDENT IN AN ORGANIZED HEALTH CARE EDUCATION/TRAINING PROGRAM

## 2024-06-02 PROCEDURE — 80069 RENAL FUNCTION PANEL: CPT | Performed by: STUDENT IN AN ORGANIZED HEALTH CARE EDUCATION/TRAINING PROGRAM

## 2024-06-02 PROCEDURE — 2500000005 HC RX 250 GENERAL PHARMACY W/O HCPCS

## 2024-06-02 PROCEDURE — 82947 ASSAY GLUCOSE BLOOD QUANT: CPT

## 2024-06-02 PROCEDURE — 2500000004 HC RX 250 GENERAL PHARMACY W/ HCPCS (ALT 636 FOR OP/ED): Performed by: STUDENT IN AN ORGANIZED HEALTH CARE EDUCATION/TRAINING PROGRAM

## 2024-06-02 PROCEDURE — 85027 COMPLETE CBC AUTOMATED: CPT | Performed by: STUDENT IN AN ORGANIZED HEALTH CARE EDUCATION/TRAINING PROGRAM

## 2024-06-02 PROCEDURE — 2500000004 HC RX 250 GENERAL PHARMACY W/ HCPCS (ALT 636 FOR OP/ED)

## 2024-06-02 PROCEDURE — 1210000001 HC SEMI-PRIVATE ROOM DAILY

## 2024-06-02 PROCEDURE — 2500000001 HC RX 250 WO HCPCS SELF ADMINISTERED DRUGS (ALT 637 FOR MEDICARE OP): Performed by: STUDENT IN AN ORGANIZED HEALTH CARE EDUCATION/TRAINING PROGRAM

## 2024-06-02 PROCEDURE — 83735 ASSAY OF MAGNESIUM: CPT | Performed by: INTERNAL MEDICINE

## 2024-06-02 PROCEDURE — 2500000001 HC RX 250 WO HCPCS SELF ADMINISTERED DRUGS (ALT 637 FOR MEDICARE OP)

## 2024-06-02 RX ORDER — LEVOFLOXACIN 25 MG/ML
750 SOLUTION ORAL DAILY
Qty: 120 ML | Refills: 0 | Status: SHIPPED | OUTPATIENT
Start: 2024-06-02 | End: 2024-06-06

## 2024-06-02 RX ADMIN — ENOXAPARIN SODIUM 40 MG: 100 INJECTION SUBCUTANEOUS at 17:08

## 2024-06-02 RX ADMIN — AZITHROMYCIN 500 MG: 500 INJECTION, POWDER, LYOPHILIZED, FOR SOLUTION INTRAVENOUS at 09:20

## 2024-06-02 RX ADMIN — LEVOTHYROXINE SODIUM 112 MCG: 112 TABLET ORAL at 07:07

## 2024-06-02 RX ADMIN — Medication 2 L/MIN: at 08:00

## 2024-06-02 RX ADMIN — GABAPENTIN 900 MG: 250 SOLUTION ORAL at 17:08

## 2024-06-02 RX ADMIN — CEFTRIAXONE SODIUM 2 G: 2 INJECTION, SOLUTION INTRAVENOUS at 17:08

## 2024-06-02 RX ADMIN — Medication 2 L/MIN: at 20:00

## 2024-06-02 RX ADMIN — ONDANSETRON 4 MG: 2 INJECTION INTRAMUSCULAR; INTRAVENOUS at 09:18

## 2024-06-02 RX ADMIN — OXYCODONE HYDROCHLORIDE 5 MG: 5 TABLET ORAL at 09:18

## 2024-06-02 RX ADMIN — Medication 2 L/MIN: at 20:09

## 2024-06-02 ASSESSMENT — COGNITIVE AND FUNCTIONAL STATUS - GENERAL
DAILY ACTIVITIY SCORE: 24
MOBILITY SCORE: 24
DAILY ACTIVITIY SCORE: 24
MOBILITY SCORE: 24
DAILY ACTIVITIY SCORE: 24
MOBILITY SCORE: 24

## 2024-06-02 ASSESSMENT — PAIN SCALES - GENERAL
PAINLEVEL_OUTOF10: 5 - MODERATE PAIN
PAINLEVEL_OUTOF10: 6

## 2024-06-02 ASSESSMENT — PAIN - FUNCTIONAL ASSESSMENT
PAIN_FUNCTIONAL_ASSESSMENT: 0-10
PAIN_FUNCTIONAL_ASSESSMENT: 0-10

## 2024-06-02 ASSESSMENT — PAIN DESCRIPTION - LOCATION: LOCATION: NECK

## 2024-06-02 ASSESSMENT — PAIN SCALES - PAIN ASSESSMENT IN ADVANCED DEMENTIA (PAINAD): TOTALSCORE: MEDICATION (SEE MAR)

## 2024-06-02 NOTE — CARE PLAN
Problem: Nutrition  Goal: Tube feed tolerance  Outcome: Progressing     Problem: Nutrition  Goal: Nutrition support goals are met within 48 hrs  Outcome: Progressing     Problem: Safety - Adult  Goal: Free from fall injury  Outcome: Progressing       The clinical goals for the shift include patient will be free from injury/falls this shift    Patient's pulse ox will remain > 92% throughout shift.

## 2024-06-02 NOTE — NURSING NOTE
Patient admitted to [place] [address] from the ER this evening. He was oriented to the room and call light system. Admission screenings were completed. He lives at home with his fiance. He is independent with ADLs including PEG tube care and tube feed boluses. He is being treated for pneumonia. He anticipates discharging back to home when medically ready. Will continue to assess for discharge needs while in the hospital.

## 2024-06-02 NOTE — PROGRESS NOTES
06/02/24 1156   Discharge Planning   Home or Post Acute Services None   Patient expects to be discharged to: home   Does the patient need discharge transport arranged? No       Patient will discharge to home. No discharge needs stated. Family will provide transportation home.

## 2024-06-02 NOTE — CARE PLAN
The patient's goals for the shift include  tube feed tolerance and no c/o pain or SOB.    The clinical goals for the shift include patient will be free from injury/falls this shift, O2 sats above 90%, and remain HDS    Over the shift, the patient was able to tolerate RA, no )2 requirements, remian HDS, and free from injury.

## 2024-06-02 NOTE — PROGRESS NOTES
Blane Sharma is a 59 y.o. male on hospital day 2 of admission presenting with Multifocal pneumonia.    Subjective     Patient denies SOB. Somewhat nauseous from tube feeds today. Will likely discharge home tomorrow to finish abx course.    Objective     GENERAL APPEARANCE: A&Ox3, appears in no acute distress  HEAD: normocephalic, atraumatic  THROAT: Oral cavity and pharynx normal. No inflammation, swelling, exudate, or lesions.  NECK: Neck supple, non-tender without lymphadenopathy, masses or thyromegaly.  CARDIAC: Normal S1 and S2. No S3, S4 or murmurs. Rhythm is regular. There is no peripheral edema, cyanosis or pallor. Extremities are warm and well perfused. No carotid bruits.  LUNGS: Clear to auscultation bilaterally without rales, rhonchi, wheezing or diminished breath sounds.  ABDOMEN: Positive bowel sounds. Soft, nondistended, nontender. No guarding or rebound. No masses.  EXTREMITIES: No significant deformity or joint abnormality. No edema. Peripheral pulses intact. No varicosities.  SKIN: Skin normal color, texture and turgor with no lesions or rash  PSYCHIATRIC: oriented to person, place, and time, good judgement and reason, without hallucinations, abnormal affect or abnormal behaviors during the examination. Patient is not suicidal.        Last Recorded Vitals  Blood pressure 108/74, pulse 82, temperature 36.6 °C (97.9 °F), resp. rate 15, height 1.829 m (6'), weight 75.2 kg (165 lb 12.6 oz), SpO2 95%.  Intake/Output last 3 Shifts:  I/O last 3 completed shifts:  In: 100 (1.3 mL/kg) [NG/GT:100]  Out: - (0 mL/kg)   Weight: 75.2 kg     Relevant Results  Lab Results   Component Value Date    WBC 12.3 (H) 06/02/2024    HGB 14.1 06/02/2024    HCT 43.1 06/02/2024    MCV 88 06/02/2024     (H) 06/02/2024      Lab Results   Component Value Date    GLUCOSE 149 (H) 06/02/2024    CALCIUM 9.1 06/02/2024     06/02/2024    K 3.7 06/02/2024    CO2 30 06/02/2024    CL 98 06/02/2024    BUN 10 06/02/2024     CREATININE 0.70 06/02/2024     Scheduled medications  cefTRIAXone, 2 g, intravenous, q24h  enoxaparin, 40 mg, subcutaneous, q24h  gabapentin, 900 mg, g-tube, TID  levothyroxine, 112 mcg, g-tube, Daily before breakfast  lidocaine, 1 patch, transdermal, Daily  oxygen, , inhalation, Continuous - Inhalation  pneumoc 20-doyle conj-dip cr(PF), 0.5 mL, intramuscular, During hospitalization      Continuous medications     PRN medications  PRN medications: ondansetron, oxyCODONE    Assessment/Plan     Principal Problem:    Multifocal pneumonia     Blane Sharma is a 59 y.o. 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  He reported shortness of breath while resting and exerting.  He reported history of night sweat along with pleuritic chest pain.  Vital signs showed desaturation of 88% went up to 98 on 2 L nasal cannula.  Physical exam significant for crackles bilaterally in the upper and middle zones along with PEG tube in the abdomen without any significant concerns around it.  Labs were significant for leukocytosis along with mildly elevated ALT and AST.  Blood cultures 2 days ago when he presented to the ED was negative.  Given the history and physical exam, most likely community-acquired pneumonia secondary to an aspiration event.     Community-acquired pneumonia  - Patient presents shortness of breath over the last 3 weeks along with physical exam significant for hypoxia along with the PACs bilaterally, labs were significant for leukocytosis  - Will order strep pneumo, Legionella, MRSA  - cont. 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 PNA treatment, likely discharge in the am     Mark Davis MD     The patient encounter includes all but not limited to; Evaluation of laboratory results, pertinent imaging, and vital signs. Daily updates are discussed with any consulting services and family/medical power of  as needed. The patient's discharge  process begins at admission and daily contact with the patient's TCC and SW is pertinent in their efficient and safe discharge.

## 2024-06-03 ENCOUNTER — TELEPHONE (OUTPATIENT)
Dept: ADMISSION | Facility: HOSPITAL | Age: 59
End: 2024-06-03
Payer: COMMERCIAL

## 2024-06-03 DIAGNOSIS — G89.3 CANCER RELATED PAIN: ICD-10-CM

## 2024-06-03 LAB
GLUCOSE BLD MANUAL STRIP-MCNC: 128 MG/DL (ref 74–99)
GLUCOSE BLD MANUAL STRIP-MCNC: 138 MG/DL (ref 74–99)
GLUCOSE BLD MANUAL STRIP-MCNC: 186 MG/DL (ref 74–99)
MAGNESIUM SERPL-MCNC: 2.18 MG/DL (ref 1.6–2.4)

## 2024-06-03 PROCEDURE — 2500000001 HC RX 250 WO HCPCS SELF ADMINISTERED DRUGS (ALT 637 FOR MEDICARE OP): Performed by: INTERNAL MEDICINE

## 2024-06-03 PROCEDURE — 99232 SBSQ HOSP IP/OBS MODERATE 35: CPT | Performed by: INTERNAL MEDICINE

## 2024-06-03 PROCEDURE — 82947 ASSAY GLUCOSE BLOOD QUANT: CPT | Mod: 91

## 2024-06-03 PROCEDURE — 2500000001 HC RX 250 WO HCPCS SELF ADMINISTERED DRUGS (ALT 637 FOR MEDICARE OP)

## 2024-06-03 PROCEDURE — 82947 ASSAY GLUCOSE BLOOD QUANT: CPT

## 2024-06-03 PROCEDURE — 2500000004 HC RX 250 GENERAL PHARMACY W/ HCPCS (ALT 636 FOR OP/ED): Performed by: STUDENT IN AN ORGANIZED HEALTH CARE EDUCATION/TRAINING PROGRAM

## 2024-06-03 PROCEDURE — 2500000004 HC RX 250 GENERAL PHARMACY W/ HCPCS (ALT 636 FOR OP/ED)

## 2024-06-03 PROCEDURE — 2500000005 HC RX 250 GENERAL PHARMACY W/O HCPCS

## 2024-06-03 PROCEDURE — 1210000001 HC SEMI-PRIVATE ROOM DAILY

## 2024-06-03 PROCEDURE — 2500000004 HC RX 250 GENERAL PHARMACY W/ HCPCS (ALT 636 FOR OP/ED): Performed by: INTERNAL MEDICINE

## 2024-06-03 RX ORDER — POTASSIUM CHLORIDE 1.5 G/1.58G
20 POWDER, FOR SOLUTION ORAL ONCE
Status: COMPLETED | OUTPATIENT
Start: 2024-06-03 | End: 2024-06-03

## 2024-06-03 RX ORDER — OXYCODONE HYDROCHLORIDE 10 MG/1
10 TABLET ORAL EVERY 6 HOURS PRN
Qty: 120 TABLET | Refills: 0 | Status: SHIPPED | OUTPATIENT
Start: 2024-06-03 | End: 2024-07-03

## 2024-06-03 RX ORDER — LEVOFLOXACIN 5 MG/ML
750 INJECTION, SOLUTION INTRAVENOUS EVERY 24 HOURS
Status: DISCONTINUED | OUTPATIENT
Start: 2024-06-03 | End: 2024-06-04 | Stop reason: HOSPADM

## 2024-06-03 RX ADMIN — Medication 3 L/MIN: at 20:00

## 2024-06-03 RX ADMIN — LEVOFLOXACIN 750 MG: 5 INJECTION, SOLUTION INTRAVENOUS at 14:22

## 2024-06-03 RX ADMIN — GABAPENTIN 900 MG: 250 SOLUTION ORAL at 11:22

## 2024-06-03 RX ADMIN — GABAPENTIN 900 MG: 250 SOLUTION ORAL at 16:52

## 2024-06-03 RX ADMIN — ENOXAPARIN SODIUM 40 MG: 100 INJECTION SUBCUTANEOUS at 16:52

## 2024-06-03 RX ADMIN — POTASSIUM CHLORIDE 20 MEQ: 1.5 POWDER, FOR SOLUTION ORAL at 13:31

## 2024-06-03 RX ADMIN — ONDANSETRON 4 MG: 2 INJECTION INTRAMUSCULAR; INTRAVENOUS at 11:01

## 2024-06-03 RX ADMIN — LEVOTHYROXINE SODIUM 112 MCG: 112 TABLET ORAL at 05:15

## 2024-06-03 ASSESSMENT — PAIN - FUNCTIONAL ASSESSMENT: PAIN_FUNCTIONAL_ASSESSMENT: 0-10

## 2024-06-03 ASSESSMENT — PAIN SCALES - GENERAL
PAINLEVEL_OUTOF10: 0 - NO PAIN
PAINLEVEL_OUTOF10: 0 - NO PAIN

## 2024-06-03 NOTE — PROGRESS NOTES
Blane Sharma is a 59 y.o. male on day 3 of admission presenting with Multifocal pneumonia.    Subjective   No events over night,     Reports coughing mucoid sputum,   No Sob at rest,     RN reported no new events.        Objective     Physical Exam  Constitutional:       Comments: Oriented x 3  Comfortable at rest on RA   HENT:      Head: Normocephalic.      Nose: Nose normal.      Mouth/Throat:      Mouth: Mucous membranes are moist.   Eyes:      Extraocular Movements: Extraocular movements intact.      Pupils: Pupils are equal, round, and reactive to light.   Cardiovascular:      Rate and Rhythm: Normal rate and regular rhythm.      Heart sounds: Normal heart sounds. No murmur heard.  Pulmonary:      Effort: Pulmonary effort is normal. No respiratory distress.      Breath sounds: Normal breath sounds. No wheezing.   Abdominal:      General: There is no distension.      Palpations: Abdomen is soft.      Comments: PEG tube in place, site looks normal,   Abdomen soft, non tender     Musculoskeletal:         General: Normal range of motion.      Cervical back: Normal range of motion.   Skin:     General: Skin is warm.   Neurological:      General: No focal deficit present.      Mental Status: He is alert and oriented to person, place, and time.   Psychiatric:         Mood and Affect: Mood normal.         Last Recorded Vitals  Blood pressure (!) 137/96, pulse 89, temperature 36.6 °C (97.9 °F), temperature source Temporal, resp. rate 16, height 1.829 m (6'), weight 75.2 kg (165 lb 12.6 oz), SpO2 94%.  Intake/Output last 3 Shifts:  I/O last 3 completed shifts:  In: 485 (6.4 mL/kg) [NG/GT:435; IV Piggyback:50]  Out: 450 (6 mL/kg) [Urine:450 (0.2 mL/kg/hr)]  Weight: 75.2 kg     Relevant Results  Scheduled medications  enoxaparin, 40 mg, subcutaneous, q24h  gabapentin, 900 mg, g-tube, TID  levoFLOXacin, 750 mg, intravenous, q24h  levothyroxine, 112 mcg, g-tube, Daily before breakfast  lidocaine, 1 patch, transdermal,  Daily  oxygen, , inhalation, Continuous - Inhalation  pneumoc 20-doyle conj-dip cr(PF), 0.5 mL, intramuscular, During hospitalization  potassium chloride, 20 mEq, g-tube, Once      Continuous medications     PRN medications  PRN medications: ondansetron, oxyCODONE    Results for orders placed or performed during the hospital encounter of 05/31/24 (from the past 24 hour(s))   POCT GLUCOSE   Result Value Ref Range    POCT Glucose 133 (H) 74 - 99 mg/dL   POCT GLUCOSE   Result Value Ref Range    POCT Glucose 128 (H) 74 - 99 mg/dL         Assessment/Plan   Principal Problem:    Multifocal pneumonia    Blane Sharma is a 59 ear old male with PMHx of T2 N0 lesion of his right posterior glottis,  ETOH abuse (in recovery), hypothyroidism, vocal cord paralysis, s/p PEG tube presented to the ED with 3 weeks history shortness of breath along with productive cough of yellow to green sputum.  He reported shortness of breath while resting and exerting.  He reported history of night sweat along with pleuritic chest pain.  Vital signs showed desaturation of 88% went up to 98 on 2 L nasal cannula.  Physical exam significant for crackles bilaterally in the upper and middle zones along with PEG tube in the abdomen without any significant concerns around it.  Labs were significant for leukocytosis along with mildly elevated ALT and AST.  Blood cultures are negative. likely community-acquired pneumonia.      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) [phone]  Dispo: cont. PNA treatment, likely discharge tomorrow. ( Doesn't feel well enough for home today).          Sadiq Townsend MD

## 2024-06-03 NOTE — TELEPHONE ENCOUNTER
Patient last seen by CARLO Montanez on 4/22 with plan to continue oxycodone 10mg q6h PRN. Follow up visit is scheduled for 7/22. OARRS reviewed and no aberrancy noted. Patient last filled oxycodone 10mg #120/30 days on 5/7. Prescription pended to provider to approve.

## 2024-06-03 NOTE — CONSULTS
Nutrition Initial Assessment:   Nutrition Assessment    Reason for Assessment: Provider consult order (enteral assessment/recommendations)    Patient is a 59 y.o. 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       Nutrition History:  Food and Nutrient History: Tube feed pump off during visit (unclear why). Per flowsheets Isosource 1.5 running at 45mL/hr on 6/2 (Osmolite 1.5 being substituted for Isosource 1.5 due to supply chain issues). Pt reports has been tolerating tube feed with no issues PTA and during hospital admission. Denies vomiting, diarrhea, constipation, abdominal pian. Endorses nausea. Reports at home did not have any PO intake and relied on tube feed via PEG tube for full nutrition. Reports tube feed regimen at home was Osmolite 1.5 six full cartons daily bolus gravity feeds (provides 1422mL total volume, 2133kcal, 89g protein, 1086mL free H2O). Suspect BG trending high d/t acute stress.  Food Allergies/Intolerances:  None  GI Symptoms: Nausea  Oral Problems:  unable to swallow per chart review       Anthropometrics:  Height: 182.9 cm (6')   Weight: 75.2 kg (165 lb 12.6 oz)   BMI (Calculated): 22.48  IBW/kg (Dietitian Calculated): 80.9 kg  Percent of IBW: 93 %       Weight History:   Wt Readings from Last 10 Encounters:   06/01/24 75.2 kg (165 lb 12.6 oz)   05/28/24 85.3 kg (188 lb) outlier - 11.8% loss ?   04/22/24 81.8 kg (180 lb 5.4 oz) - 8.1% loss   01/22/24 83.8 kg (184 lb 11.9 oz) - 10.3% loss   10/09/23 88.3 kg (194 lb 10.7 oz) - 14.8% loss   06/16/23 91.3 kg (201 lb 3 oz) - 17.6% loss   03/17/23 90.5 kg (199 lb 9 oz)   01/30/23 91.9 kg (202 lb 9.6 oz)   11/07/22 84.1 kg (185 lb 6.5 oz)   09/16/22 90.3 kg (199 lb)       Weight Change %:  Weight History / % Weight Change: Reports  usual body wt of 200lb.  Significant Weight Loss: Yes  Interpretation of Weight Loss: >10% in 6 months    Nutrition Focused Physical Exam Findings:    Subcutaneous Fat Loss:   Orbital Fat Pads: Mild-Moderate (slight dark circles and slight hollowing)  Buccal Fat Pads: Mild-Moderate (flat cheeks, minimal bounce)  Triceps: Mild-Moderate (less than ample fat tissue)  Muscle Wasting:  Temporalis: Mild-Moderate (slight depression)  Deltoid/Trapezius: Mild-Moderate (slight protrusion of acromion process)  Quadriceps: Mild-Moderate (mild depression on inner and outer thigh)  Gastrocnemius: Mild-Moderate (not well developed muscle)  Edema:  Edema: none  Physical Findings:  Hair: Negative  Eyes: Negative  Mouth: Negative  Nails: Negative  Skin: Positive (bruising)    Nutrition Significant Labs:  CBC Trend:   Results from last 7 days   Lab Units 06/02/24  0635 05/31/24  0825 05/28/24  1144   WBC AUTO x10*3/uL 12.3* 17.1* 18.8*   RBC AUTO x10*6/uL 4.89 4.96 4.93   HEMOGLOBIN g/dL 14.1 14.8 14.7   HEMATOCRIT % 43.1 42.6 44.3   MCV fL 88 86 90   PLATELETS AUTO x10*3/uL 480* 451* 449    , BMP Trend:   Results from last 7 days   Lab Units 06/02/24  0636 05/31/24  0825 05/28/24  1144   GLUCOSE mg/dL 149* 192* 271*   CALCIUM mg/dL 9.1 8.9 9.4   SODIUM mmol/L 137 132* 130*   POTASSIUM mmol/L 3.7 4.8 3.8   CO2 mmol/L 30 28 33*   CHLORIDE mmol/L 98 93* 89*   BUN mg/dL 10 13 12   CREATININE mg/dL 0.70 0.68 0.71    , BG POCT trend:   Results from last 7 days   Lab Units 06/03/24  1209 06/03/24  0033 06/02/24  1239   POCT GLUCOSE mg/dL 186* 128* 133*    , Renal Lab Trend:   Results from last 7 days   Lab Units 06/02/24  0636 05/31/24  0825 05/28/24  1144 05/28/24  1144   POTASSIUM mmol/L 3.7 4.8  --  3.8   PHOSPHORUS mg/dL 3.5  --   --   --    SODIUM mmol/L 137 132*  --  130*   MAGNESIUM mg/dL 2.18 2.09   < >  --    EGFR mL/min/1.73m*2 >90 >90  --  >90   BUN mg/dL 10 13  --  12   CREATININE mg/dL 0.70 0.68  --  0.71    < > = values in  this interval not displayed.       Nutrition Specific Medications:  Scheduled medications  enoxaparin, 40 mg, subcutaneous, q24h  gabapentin, 900 mg, g-tube, TID  levoFLOXacin, 750 mg, intravenous, q24h  levothyroxine, 112 mcg, g-tube, Daily before breakfast  lidocaine, 1 patch, transdermal, Daily  oxygen, , inhalation, Continuous - Inhalation  pneumoc 20-doyle conj-dip cr(PF), 0.5 mL, intramuscular, During hospitalization  potassium chloride, 20 mEq, g-tube, Once       PRN medications  PRN medications: ondansetron, oxyCODONE     I/O:   Last BM Date: 06/01/24; Stool Appearance: Unable to assess (06/02/24 0814)    Dietary Orders (From admission, onward)       Start     Ordered    06/02/24 0255  Enteral feeding with NPO Isosource 1.5; PEG (percutaneous endoscopic gastric); 45; 200; Water; Every 6 hours  Diet effective now        Question Answer Comment   Tube feeding formula: Isosource 1.5    Feeding route: PEG (percutaneous endoscopic gastric)    Tube feeding continuous rate (mL/hr): 45    Tube feeding flush (mL): 200    Flush type: Water    Flush frequency: Every 6 hours        06/02/24 0255                     Estimated Needs:   Total Energy Estimated Needs (kCal):  (2886-1841)  Method for Estimating Needs: 30kcal/kg of ABW 75.2kg  Total Protein Estimated Needs (g):  (100-115)  Method for Estimating Needs: 1.3-1.5 g/kg ABW  Total Fluid Estimated Needs (mL):  (1mL/kcal or per team)           Nutrition Diagnosis   Malnutrition Diagnosis  Patient has Malnutrition Diagnosis: Yes  Diagnosis Status: New  Malnutrition Diagnosis: Moderate malnutrition related to chronic disease or condition  As Evidenced by: suspect patient meeting <75% of estimated energy needs for >1 month; significant weight loss of 8.1% in 2 months, 10.3% in 5 months; moderate muscle and subcutaneous fat loss.            Nutrition Interventions/Recommendations         Nutrition Prescription:  Recommend Glucerna 1.5 at 65mL/hr over 22 hours: provides  1430mL total volume, 2145kcal, 118g protein, 1085mL free H2O.  Initiate Glucerna 1.5 at 15mL/hr increasing rate by 10mL/hr q6h to goal rate of 65mL/hr.  Hold tube feed 1 hour pre/post synthroid dose.  Additional H2O flushes per team.  After continuous tube feed tolerance well established can transition to bolus feeds: Glucerna 1.5 at 280mL bolus 5x per day.  Hold tube feed 1 hour pre/post synthroid dose.  Flush tube with 60mL H2O before and after each bolus feed (600mL free H2O total).  Additional H2O flushes per team.        Nutrition Interventions:   Interventions: Enteral intake  Goal: tube feed tolerance, meet tube feed goal rate         Nutrition Monitoring and Evaluation   Food/Nutrient Related History Monitoring  Monitoring and Evaluation Plan: Enteral and parenteral nutrition intake  Enteral and Parenteral Nutrition Intake: Enteral nutrition formula/solution, Enteral nutrition intake  Criteria: tube feed to meet 100% of energy needs    Body Composition/Growth/Weight History  Monitoring and Evaluation Plan: Weight  Criteria: stable weight    Biochemical Data, Medical Tests and Procedures  Monitoring and Evaluation Plan: Electrolyte/renal panel, Glucose/endocrine profile  Criteria: labs WNL            Time Spent/Follow-up Reminder:   Time Spent (min): 60 minutes  Last Date of Nutrition Visit: 06/03/24  Nutrition Follow-Up Needed?: Dietitian to reassess per policy

## 2024-06-03 NOTE — TELEPHONE ENCOUNTER
Patient calls for refill on Oxycodone. It appears this was already sent.  Pt is currently impatient on [facility], bed 17.  Message sent to Palliative Care team to confirm.

## 2024-06-04 VITALS
BODY MASS INDEX: 22.46 KG/M2 | TEMPERATURE: 97.7 F | OXYGEN SATURATION: 95 % | HEIGHT: 72 IN | SYSTOLIC BLOOD PRESSURE: 132 MMHG | HEART RATE: 78 BPM | DIASTOLIC BLOOD PRESSURE: 98 MMHG | WEIGHT: 165.79 LBS | RESPIRATION RATE: 17 BRPM

## 2024-06-04 LAB
ALBUMIN SERPL BCP-MCNC: 3.4 G/DL (ref 3.4–5)
ALP SERPL-CCNC: 46 U/L (ref 33–136)
ALT SERPL W P-5'-P-CCNC: 60 U/L (ref 10–52)
ANION GAP SERPL CALC-SCNC: 12 MMOL/L (ref 10–20)
AST SERPL W P-5'-P-CCNC: 39 U/L (ref 9–39)
BACTERIA BLD CULT: NORMAL
BACTERIA BLD CULT: NORMAL
BILIRUB DIRECT SERPL-MCNC: 0.1 MG/DL (ref 0–0.3)
BILIRUB SERPL-MCNC: 0.3 MG/DL (ref 0–1.2)
BUN SERPL-MCNC: 15 MG/DL (ref 6–23)
CALCIUM SERPL-MCNC: 9.4 MG/DL (ref 8.6–10.6)
CHLORIDE SERPL-SCNC: 99 MMOL/L (ref 98–107)
CO2 SERPL-SCNC: 31 MMOL/L (ref 21–32)
CREAT SERPL-MCNC: 0.73 MG/DL (ref 0.5–1.3)
EGFRCR SERPLBLD CKD-EPI 2021: >90 ML/MIN/1.73M*2
GLUCOSE BLD MANUAL STRIP-MCNC: 137 MG/DL (ref 74–99)
GLUCOSE BLD MANUAL STRIP-MCNC: 139 MG/DL (ref 74–99)
GLUCOSE BLD MANUAL STRIP-MCNC: 148 MG/DL (ref 74–99)
GLUCOSE SERPL-MCNC: 136 MG/DL (ref 74–99)
POTASSIUM SERPL-SCNC: 3.5 MMOL/L (ref 3.5–5.3)
PROT SERPL-MCNC: 8.2 G/DL (ref 6.4–8.2)
SODIUM SERPL-SCNC: 138 MMOL/L (ref 136–145)

## 2024-06-04 PROCEDURE — 2500000004 HC RX 250 GENERAL PHARMACY W/ HCPCS (ALT 636 FOR OP/ED): Performed by: INTERNAL MEDICINE

## 2024-06-04 PROCEDURE — 80048 BASIC METABOLIC PNL TOTAL CA: CPT | Performed by: INTERNAL MEDICINE

## 2024-06-04 PROCEDURE — 2500000001 HC RX 250 WO HCPCS SELF ADMINISTERED DRUGS (ALT 637 FOR MEDICARE OP)

## 2024-06-04 PROCEDURE — 99239 HOSP IP/OBS DSCHRG MGMT >30: CPT | Performed by: INTERNAL MEDICINE

## 2024-06-04 PROCEDURE — 2500000001 HC RX 250 WO HCPCS SELF ADMINISTERED DRUGS (ALT 637 FOR MEDICARE OP): Performed by: INTERNAL MEDICINE

## 2024-06-04 PROCEDURE — 36415 COLL VENOUS BLD VENIPUNCTURE: CPT | Performed by: INTERNAL MEDICINE

## 2024-06-04 PROCEDURE — 82947 ASSAY GLUCOSE BLOOD QUANT: CPT | Mod: 91

## 2024-06-04 PROCEDURE — 82248 BILIRUBIN DIRECT: CPT | Performed by: INTERNAL MEDICINE

## 2024-06-04 RX ORDER — POTASSIUM CHLORIDE 1.5 G/1.58G
20 POWDER, FOR SOLUTION ORAL ONCE
Status: COMPLETED | OUTPATIENT
Start: 2024-06-04 | End: 2024-06-04

## 2024-06-04 RX ADMIN — POTASSIUM CHLORIDE 20 MEQ: 1.5 POWDER, FOR SOLUTION ORAL at 11:33

## 2024-06-04 RX ADMIN — LEVOTHYROXINE SODIUM 112 MCG: 112 TABLET ORAL at 06:13

## 2024-06-04 RX ADMIN — LEVOFLOXACIN 750 MG: 5 INJECTION, SOLUTION INTRAVENOUS at 11:33

## 2024-06-04 ASSESSMENT — COGNITIVE AND FUNCTIONAL STATUS - GENERAL
MOBILITY SCORE: 24
DAILY ACTIVITIY SCORE: 24
MOBILITY SCORE: 24
DAILY ACTIVITIY SCORE: 24
MOBILITY SCORE: 24
DAILY ACTIVITIY SCORE: 24

## 2024-06-04 ASSESSMENT — PAIN SCALES - GENERAL: PAINLEVEL_OUTOF10: 0 - NO PAIN

## 2024-06-04 NOTE — NURSING NOTE
Patient discharged to home today. He verbalized an understanding of the AVS after review with this nurse. He packed all his belongings and a canister was given to him for PEG flushing at home. He verbalized he already had understanding of how to use it. His IV was removed by his bedside nurse Damien SOLARES. His prescriptions were called to [facility] near his home. His fiance was here to visit and take him home.

## 2024-06-04 NOTE — DISCHARGE SUMMARY
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  He reported shortness of breath while resting and exerting.  He reported history of night sweat along with pleuritic chest pain.  Vital signs showed desaturation of 88% went up to 98 on 2 L nasal cannula.  Physical exam significant for crackles bilaterally in the upper and middle zones along with PEG tube in the abdomen without any significant concerns around it.  Labs were significant for leukocytosis along with mildly elevated ALT and AST.  Blood cultures were negative. Ct showed multifocal pneumonia which was thought to be due to likely community-acquired pneumonia.      Community-acquired pneumonia  - Patient presents shortness of breath over the last 3 weeks along with physical exam significant for hypoxia along with the PACs bilaterally, labs were significant for leukocytosis  Strep pneumo, Legionella, negative,   Changed Antibiotics to Levofloxacin 750 mg daily, complete 10 day course.   - abdominal x-ray with contrast for PEG tube showed normal PEG with no contrast extravasation   - Blood cultures NGTD  - Patient denies SOB at this time  - Patient improved,  Advised to stay upright after tube feeds to prevent aspiration.      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 Advised follow up with PCP.   Discharge day management time > 30 minutes.       Pertinent Physical Exam At Time of Discharge  Vitals:    06/04/24 0848   BP: (!) 132/98   Pulse: 78   Resp: 17   Temp: 36.5 °C (97.7 °F)   SpO2: 95%       Physical Exam  Constitutional:       Appearance: Normal appearance.   HENT:      Head: Normocephalic.      Nose: Nose normal.   Eyes:      Extraocular Movements: Extraocular movements intact.      Pupils: Pupils are equal, round, and reactive to light.   Cardiovascular:      Rate and Rhythm: Normal rate and regular rhythm.      Heart sounds: Normal heart sounds. No murmur heard.  Pulmonary:      Effort: No respiratory distress.      Breath sounds: Normal breath sounds. No wheezing.   Abdominal:      General: There is no distension.      Palpations: Abdomen is soft.      Tenderness: There is no abdominal tenderness.   Musculoskeletal:         General: Normal range of motion.      Cervical back: Normal range of motion.   Skin:     General: Skin is warm.   Neurological:      General: No focal deficit present.      Mental Status: He is alert and oriented to person, place, and time.   Psychiatric:         Mood and Affect: Mood normal.         Home Medications     Medication List      START taking these medications     levoFLOXacin 250 mg/10 mL solution; Commonly known as: Levaquin; Take 30   mL (750 mg) by mouth once daily for 4 days.     CHANGE how you take these medications     oxyCODONE 10 mg immediate release tablet; Commonly known as: Roxicodone;   Take 1 tablet (10 mg) by mouth every 6 hours if needed for moderate pain   (4 - 6).; What changed: how to take this, reasons to take this     CONTINUE taking these medications     amoxicillin-pot clavulanate 875-125 mg tablet; Commonly known as:   Augmentin; Take 1 tablet by mouth every 12 hours for 7 days.   gabapentin 250 mg/5 mL solution; Commonly known as: Neurontin; Take 18   mL (900 mg) by mouth 3 times a day.    levothyroxine 112 mcg tablet; Commonly known as: Synthroid, Levoxyl   methocarbamol 500 mg tablet; Commonly known as: Robaxin; Take 1-2   tablets (500-1,000 mg) by mouth 2 times a day as needed for muscle spasms.   VITAL 1.5 BONY ORAL     STOP taking these medications     doxycycline 100 mg capsule; Commonly known as: Vibramycin       Outpatient Follow-Up  Future Appointments   Date Time Provider Department Center   7/22/2024 10:00 AM [id number] Fabian Townsend MD

## 2024-06-04 NOTE — CARE PLAN
The patient's goals for the shift include To be discharged home, remain off supplemental oxygen.    The clinical goals for the shift include Maintain sufficient oxygenation on room air..    Over the shift, the patient did make progress toward the following goals.       Problem: Pain - Adult  Goal: Verbalizes/displays adequate comfort level or baseline comfort level  Outcome: Met     Problem: Safety - Adult  Goal: Free from fall injury  Outcome: Met     Problem: Discharge Planning  Goal: Discharge to home or other facility with appropriate resources  Outcome: Met     Problem: Chronic Conditions and Co-morbidities  Goal: Patient's chronic conditions and co-morbidity symptoms are monitored and maintained or improved  Outcome: Met     Problem: Nutrition  Goal: Nutrition support is meeting 75% of nutrient needs  Outcome: Met  Goal: Tube feed tolerance  Outcome: Progressing

## 2024-06-04 NOTE — CARE PLAN
The patient's goals for the shift include  tolerance of tube feeds and no SOB    The clinical goals for the shift include Patient will have no decrease in oxygenation

## 2024-06-04 NOTE — PROGRESS NOTES
06/04/24 1208   Discharge Planning   Home or Post Acute Services None   Patient expects to be discharged to: home   Does the patient need discharge transport arranged? No       Patient will discharge to home today. Patient stated he has tube feeding and supplies at home. No other discharge needs stated. Spouse will transport patient home after his IV antibiotics are complete.

## 2024-06-05 NOTE — SIGNIFICANT EVENT
PNEUMONIA Follow up Call    The Patient discharged With the following Diagnosis: Pneumonia:        How is your breathing? Improved     How is your activity? less energy for daily activites   How is your cough?  usual amount   Mucus: mucus: usual amount and color   How often are you using a Quick Relief/ Rescue Inhaler / Nebulizer:   not applicable

## 2024-06-16 NOTE — DOCUMENTATION CLARIFICATION NOTE
"    PATIENT:               [facility]ANI[id number]  MRN:                       [id number]  :                       1965  ADMIT DATE:       2024 7:59 AM  DISCH DATE:        2024 2:05 PM  RESPONDING PROVIDER #:        [id number]          PROVIDER RESPONSE TEXT:    Moderate Protein Calorie Malnutrition    CDI QUERY TEXT:    Clarification    Instruction:    Based on your assessment of the patient and the clinical information, please provide the requested documentation by clicking on the appropriate radio button and enter any additional information if prompted.    Question: Please further clarify this patient nutritional status as    When answering this query, please exercise your independent professional judgment. The fact that a question is being asked, does not imply that any particular answer is desired or expected.    The patient's clinical indicators include:  Clinical Information:  The patient is a 59 year old male who presented to the ED with 3 weeks history of SOB along with productive cough of yellow to green sputum.    Clinical Indicators: BMI 22.48    Per the Nutrition Consult note from 2024 at 11: 21 am:  \"Nutrition Diagnosis  Malnutrition Diagnosis  Patient has Malnutrition Diagnosis: Yes  Diagnosis Status: New  Malnutrition Diagnosis: Moderate malnutrition related to chronic disease or condition  As Evidenced by: suspect patient meeting <75% of estimated energy needs for >1 month; significant weight loss of 8.1% in 2 months, 10.3% in 5 months; moderate muscle and subcutaneous fat loss.\"    Treatment: Nutrition consult, Glucerna 1.5 tube feeding    Risk Factors: significant weight loss of 8.1% in 2 months, 10.3% in 5 months; moderate muscle and subcutaneous fat loss  Options provided:  -- Moderate Protein Calorie Malnutrition  -- Other - I will add my own diagnosis  -- Refer to Clinical Documentation Reviewer    Query created by: Sirena Gilmore on 2024 9:01 " AM      Electronically signed by:  AVELINO EMERSON MD 6/16/2024 9:08 AM

## 2024-06-16 NOTE — DOCUMENTATION CLARIFICATION NOTE
PATIENT:               [id number]  MRN:                       [id number]  :                       1965  ADMIT DATE:       2024 7:59 AM  DISCH DATE:        2024 2:05 PM  RESPONDING PROVIDER #:        [id number]          PROVIDER RESPONSE TEXT:    Sepsis was a differential diagnosis and ruled out after study    CDI QUERY TEXT:    Clarification    Instruction:  Based on your assessment of the patient and the clinical information, please provide the requested documentation by clicking on the appropriate radio button and enter any additional information if prompted.    Question: Sepsis was documented in the medical record. Based on the documentation and the clinical information, can the diagnosis be further clarified as    When answering this query, please exercise your independent professional judgment. The fact that a question is being asked, does not imply that any particular answer is desired or expected.    The patient's clinical indicators include:  Clinical Information: The patient is a 59 year old male who presented to the ED with 3 weeks history of SOB along with productive cough of yellow to green sputum.    Documented Diagnosis: Sepsis    Clinical Indicators:  -Vital Signs: 24 at 07:56: /77, T 35.6, , RR 20, SPO2 88 percent  -Vital Signs:  at 0934: BP 87/66, HR 76, SPO2 97 percent on supplemental O2  -WBC: : 17.1, : 12.3  -Microbiology Results:  at 23:54: Staphylococcus Aureus/MRSA Colonization Culture: No staphylococcus isolated  -Neutrophils/100 Leukocytes in blood: : 84.1  -Neutrophils 10 3/UL: : 14.40  -Lactic acid: : 2.0  -BUN/Creat: : 13/0.68  -Blood cultures:  at 10:45: No growth at 4 days  -Bilirubin: Total : 0.5; Direct : 0.1  -MAP: 93, 73  -S[place]ks Coma Scale: n/a  -PAO2/FIO2: 80/28: 285  -Procalcitonin: n/a  -Platelets: : 451  -Other clinical indicators: VBG  at 0827: pH 7.48, pCO2  45, pO2 53, SO2 78, Lactate 2.0, HCO3 33.5. 3 week history of SOB along with productive cough of yellow to green sputum, night sweats, pleuritic chest pain    Treatment: 2L 02, Ceftriaxone 2g daily, Azithromycin 500 mg Q24H, Levofloxacin 750 mg daily, complete 10 day course    Risk Factors: Pneumonia  Options provided:  -- Sepsis was a differential diagnosis and ruled out after study  -- Sepsis with cardiac organ dysfunction of Hypotension  -- Sepsis with respiratory organ dysfunction of Acute Hypoxic Respiratory Failure  -- Sepsis with multi-system organ dysfunction of Acute Hypoxic Respiratory Failure and Hypotension  -- Sepsis with other organ dysfunction, Please specify sepsis associated organ dysfunction below  -- Other - I will add my own diagnosis  -- Refer to Clinical Documentation Reviewer    Query created by: Sirena Gilmore on 6/11/2024 8:56 AM      Electronically signed by:  AVELINO EMERSON MD 6/16/2024 5:42 PM

## 2024-06-27 ENCOUNTER — HOSPITAL ENCOUNTER (OUTPATIENT)
Dept: RADIOLOGY | Facility: CLINIC | Age: 59
Discharge: HOME | End: 2024-06-27
Payer: COMMERCIAL

## 2024-06-27 ENCOUNTER — APPOINTMENT (OUTPATIENT)
Dept: PRIMARY CARE | Facility: CLINIC | Age: 59
End: 2024-06-27
Payer: COMMERCIAL

## 2024-06-27 VITALS
HEART RATE: 88 BPM | HEIGHT: 72 IN | RESPIRATION RATE: 18 BRPM | OXYGEN SATURATION: 93 % | SYSTOLIC BLOOD PRESSURE: 109 MMHG | WEIGHT: 169.6 LBS | DIASTOLIC BLOOD PRESSURE: 76 MMHG | BODY MASS INDEX: 22.97 KG/M2

## 2024-06-27 DIAGNOSIS — J18.9 PNEUMONIA DUE TO INFECTIOUS ORGANISM, UNSPECIFIED LATERALITY, UNSPECIFIED PART OF LUNG: ICD-10-CM

## 2024-06-27 DIAGNOSIS — R06.02 SHORTNESS OF BREATH: ICD-10-CM

## 2024-06-27 DIAGNOSIS — Z76.89 ENCOUNTER TO ESTABLISH CARE: Primary | ICD-10-CM

## 2024-06-27 PROBLEM — J98.8 RESPIRATORY OBSTRUCTION: Status: ACTIVE | Noted: 2024-06-27

## 2024-06-27 PROBLEM — B35.3 TINEA PEDIS: Status: RESOLVED | Noted: 2024-06-27 | Resolved: 2024-06-27

## 2024-06-27 PROBLEM — J01.90 ACUTE SINUSITIS: Status: RESOLVED | Noted: 2024-06-27 | Resolved: 2024-06-27

## 2024-06-27 PROBLEM — J32.9 CHRONIC SINUSITIS: Status: RESOLVED | Noted: 2017-06-27 | Resolved: 2024-06-27

## 2024-06-27 PROBLEM — I10 BENIGN ESSENTIAL HYPERTENSION: Status: ACTIVE | Noted: 2024-06-27

## 2024-06-27 PROBLEM — G83.9 PARALYSIS (MULTI): Status: RESOLVED | Noted: 2024-06-27 | Resolved: 2024-06-27

## 2024-06-27 PROBLEM — K81.0 ACUTE CHOLECYSTITIS: Status: RESOLVED | Noted: 2024-06-27 | Resolved: 2024-06-27

## 2024-06-27 PROBLEM — K42.9 UMBILICAL HERNIA: Status: RESOLVED | Noted: 2024-06-27 | Resolved: 2024-06-27

## 2024-06-27 PROBLEM — R00.0 TACHYCARDIA: Status: RESOLVED | Noted: 2018-11-15 | Resolved: 2024-06-27

## 2024-06-27 PROBLEM — R68.89 SUSPECTED MALIGNANT NEOPLASM: Status: ACTIVE | Noted: 2024-06-27

## 2024-06-27 PROBLEM — J20.9 ACUTE BRONCHITIS: Status: RESOLVED | Noted: 2024-06-27 | Resolved: 2024-06-27

## 2024-06-27 PROBLEM — R06.09 DYSPNEA ON EXERTION: Status: RESOLVED | Noted: 2024-06-27 | Resolved: 2024-06-27

## 2024-06-27 PROCEDURE — 3074F SYST BP LT 130 MM HG: CPT

## 2024-06-27 PROCEDURE — 71046 X-RAY EXAM CHEST 2 VIEWS: CPT | Performed by: RADIOLOGY

## 2024-06-27 PROCEDURE — 71046 X-RAY EXAM CHEST 2 VIEWS: CPT

## 2024-06-27 PROCEDURE — 3078F DIAST BP <80 MM HG: CPT

## 2024-06-27 PROCEDURE — 99203 OFFICE O/P NEW LOW 30 MIN: CPT

## 2024-06-27 ASSESSMENT — ENCOUNTER SYMPTOMS
CHILLS: 0
DIZZINESS: 0
SHORTNESS OF BREATH: 0
FEVER: 0
HEADACHES: 0
FATIGUE: 0

## 2024-06-27 ASSESSMENT — PATIENT HEALTH QUESTIONNAIRE - PHQ9
2. FEELING DOWN, DEPRESSED OR HOPELESS: NOT AT ALL
SUM OF ALL RESPONSES TO PHQ9 QUESTIONS 1 AND 2: 0
1. LITTLE INTEREST OR PLEASURE IN DOING THINGS: NOT AT ALL

## 2024-06-27 ASSESSMENT — PAIN SCALES - GENERAL: PAINLEVEL: 0-NO PAIN

## 2024-06-27 NOTE — PROGRESS NOTES
Subjective   Patient ID: Blane Sharma is a 59 y.o. male who presents for Hospital Follow-up.    HPI   59 y.o. male with PMH remarkable for HTN, hypothyroidism, GERD, PEG tube, squamous cell cancer of buccal mucosa here today to establish care and for hospital follow up for pneumonia. Patient states he is feeling better, but still having some SOB which is to be expected. Denies any other symptoms today.     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  He reported shortness of breath while resting and exerting.  He reported history of night sweat along with pleuritic chest pain.  Vital signs showed desaturation of 88% went up to 98 on 2 L nasal cannula.  Physical exam significant for crackles bilaterally in the upper and middle zones along with PEG tube in the abdomen without any significant concerns around it.  Labs were significant for leukocytosis along with mildly elevated ALT and AST.  Blood cultures were negative. Ct showed multifocal pneumonia which was thought to be due to likely community-acquired pneumonia.       Review of Systems   Constitutional:  Negative for chills, fatigue and fever.   Respiratory:  Positive for shortness of breath.    Cardiovascular:  Negative for chest pain.   Neurological:  Negative for dizziness and headaches.   All other systems reviewed and are negative.      Objective   /76   Pulse 88   Resp 18   Ht 1.829 m (6')   Wt 76.9 kg (169 lb 9.6 oz)   SpO2 93%   BMI 23.00 kg/m²     Physical Exam  Vitals reviewed.   Constitutional:       Appearance: Normal appearance.   HENT:      Head: Normocephalic and atraumatic.   Eyes:      Extraocular Movements: Extraocular movements intact.      Conjunctiva/sclera: Conjunctivae  normal.   Neck:      Vascular: No carotid bruit.   Cardiovascular:      Rate and Rhythm: Normal rate and regular rhythm.      Pulses: Normal pulses.      Heart sounds: Normal heart sounds.   Pulmonary:      Effort: Pulmonary effort is normal.      Breath sounds: Wheezing and rhonchi present. No rales.   Musculoskeletal:      Cervical back: Normal range of motion and neck supple.   Neurological:      General: No focal deficit present.      Mental Status: He is alert and oriented to person, place, and time.   Psychiatric:         Mood and Affect: Mood normal.         Behavior: Behavior normal.         Thought Content: Thought content normal.         Judgment: Judgment normal.         Assessment/Plan   Problem List Items Addressed This Visit             ICD-10-CM    Pneumonia due to infectious organism J18.9    Relevant Orders    XR chest 2 views (Completed)    Shortness of breath R06.02    Relevant Orders    XR chest 2 views (Completed)     Other Visit Diagnoses         Codes    Encounter to establish care    -  Primary Z76.89          - Will repeat imaging. Pending results will consider further treatment. Can consider follow up with pulmonology. Patient understands seeking care at ER with worsening symptoms.   - Follow up in 3-4 months or as needed.

## 2024-07-05 ENCOUNTER — TELEPHONE (OUTPATIENT)
Dept: ADMISSION | Facility: HOSPITAL | Age: 59
End: 2024-07-05
Payer: COMMERCIAL

## 2024-07-05 DIAGNOSIS — G89.3 CANCER RELATED PAIN: ICD-10-CM

## 2024-07-05 RX ORDER — OXYCODONE HYDROCHLORIDE 10 MG/1
10 TABLET ORAL EVERY 6 HOURS PRN
Qty: 120 TABLET | Refills: 0 | Status: SHIPPED | OUTPATIENT
Start: 2024-07-05 | End: 2024-08-04

## 2024-07-05 NOTE — TELEPHONE ENCOUNTER
Pt requesting refill  Oxycodone 10mg. 1 tablet every 6 hrs prn  Pharmacy: [facility] in [place]  Last FUV 4/22 with next FUV 7/22

## 2024-07-10 PROBLEM — R06.02 SHORTNESS OF BREATH: Status: ACTIVE | Noted: 2024-06-27

## 2024-07-10 ASSESSMENT — ENCOUNTER SYMPTOMS: SHORTNESS OF BREATH: 1

## 2024-07-22 ENCOUNTER — APPOINTMENT (OUTPATIENT)
Dept: PALLIATIVE MEDICINE | Facility: CLINIC | Age: 59
End: 2024-07-22
Payer: COMMERCIAL

## 2024-08-02 ENCOUNTER — TELEPHONE (OUTPATIENT)
Dept: ADMISSION | Facility: HOSPITAL | Age: 59
End: 2024-08-02
Payer: COMMERCIAL

## 2024-08-02 DIAGNOSIS — G89.3 CANCER RELATED PAIN: ICD-10-CM

## 2024-08-02 RX ORDER — OXYCODONE HYDROCHLORIDE 10 MG/1
10 TABLET ORAL EVERY 6 HOURS PRN
Qty: 120 TABLET | Refills: 0 | Status: SHIPPED | OUTPATIENT
Start: 2024-08-02 | End: 2024-09-01

## 2024-08-02 NOTE — TELEPHONE ENCOUNTER
[facility] report reviewed and reflects  prescription history, no aberrancy noted. Per [facility], patient last filled 30 day supply oxy 10mg on 7/7 . Per last visit with Mona Montanez patient to continue oxy 10mg q6h prn. Patient with follow up visit scheduled on 8/6. Patient updated that medication will be sent to [facility]. Refill request routed to provider.

## 2024-08-06 ENCOUNTER — LAB (OUTPATIENT)
Dept: LAB | Facility: CLINIC | Age: 59
End: 2024-08-06
Payer: COMMERCIAL

## 2024-08-06 ENCOUNTER — OFFICE VISIT (OUTPATIENT)
Dept: PALLIATIVE MEDICINE | Facility: CLINIC | Age: 59
End: 2024-08-06
Payer: COMMERCIAL

## 2024-08-06 VITALS
HEART RATE: 91 BPM | RESPIRATION RATE: 17 BRPM | SYSTOLIC BLOOD PRESSURE: 105 MMHG | DIASTOLIC BLOOD PRESSURE: 74 MMHG | TEMPERATURE: 98.1 F | BODY MASS INDEX: 21.74 KG/M2 | OXYGEN SATURATION: 94 % | WEIGHT: 160.27 LBS

## 2024-08-06 DIAGNOSIS — Z51.81 ENCOUNTER FOR MONITORING OPIOID MAINTENANCE THERAPY: ICD-10-CM

## 2024-08-06 DIAGNOSIS — Z79.891 ENCOUNTER FOR MONITORING OPIOID MAINTENANCE THERAPY: ICD-10-CM

## 2024-08-06 DIAGNOSIS — Z51.5 PALLIATIVE CARE ENCOUNTER: ICD-10-CM

## 2024-08-06 DIAGNOSIS — R63.4 WEIGHT LOSS, UNINTENTIONAL: ICD-10-CM

## 2024-08-06 DIAGNOSIS — M79.2 NEUROPATHIC PAIN: ICD-10-CM

## 2024-08-06 DIAGNOSIS — G89.3 CANCER RELATED PAIN: ICD-10-CM

## 2024-08-06 LAB
AMPHETAMINES UR QL SCN: ABNORMAL
BARBITURATES UR QL SCN: ABNORMAL
BENZODIAZ UR QL SCN: ABNORMAL
BZE UR QL SCN: ABNORMAL
CANNABINOIDS UR QL SCN: ABNORMAL
FENTANYL+NORFENTANYL UR QL SCN: ABNORMAL
METHADONE UR QL SCN: ABNORMAL
OPIATES UR QL SCN: ABNORMAL
OXYCODONE+OXYMORPHONE UR QL SCN: ABNORMAL
PCP UR QL SCN: ABNORMAL

## 2024-08-06 PROCEDURE — 99214 OFFICE O/P EST MOD 30 MIN: CPT | Performed by: NURSE PRACTITIONER

## 2024-08-06 PROCEDURE — 3078F DIAST BP <80 MM HG: CPT | Performed by: NURSE PRACTITIONER

## 2024-08-06 PROCEDURE — 80307 DRUG TEST PRSMV CHEM ANLYZR: CPT

## 2024-08-06 PROCEDURE — 80349 CANNABINOIDS NATURAL: CPT

## 2024-08-06 PROCEDURE — 80365 DRUG SCREENING OXYCODONE: CPT

## 2024-08-06 PROCEDURE — 3074F SYST BP LT 130 MM HG: CPT | Performed by: NURSE PRACTITIONER

## 2024-08-06 RX ORDER — GABAPENTIN 250 MG/5ML
900 SOLUTION ORAL 3 TIMES DAILY
Qty: 1620 ML | Refills: 3 | Status: SHIPPED | OUTPATIENT
Start: 2024-08-06 | End: 2024-09-05

## 2024-08-06 RX ORDER — OXYCODONE HYDROCHLORIDE 10 MG/1
10 TABLET ORAL EVERY 6 HOURS PRN
Qty: 120 TABLET | Refills: 0 | Status: SHIPPED | OUTPATIENT
Start: 2024-08-06 | End: 2024-09-05

## 2024-08-06 ASSESSMENT — PATIENT HEALTH QUESTIONNAIRE - PHQ9
2. FEELING DOWN, DEPRESSED OR HOPELESS: NOT AT ALL
1. LITTLE INTEREST OR PLEASURE IN DOING THINGS: NOT AT ALL
SUM OF ALL RESPONSES TO PHQ9 QUESTIONS 1 AND 2: 0

## 2024-08-06 ASSESSMENT — COLUMBIA-SUICIDE SEVERITY RATING SCALE - C-SSRS
2. HAVE YOU ACTUALLY HAD ANY THOUGHTS OF KILLING YOURSELF?: NO
6. HAVE YOU EVER DONE ANYTHING, STARTED TO DO ANYTHING, OR PREPARED TO DO ANYTHING TO END YOUR LIFE?: NO
1. IN THE PAST MONTH, HAVE YOU WISHED YOU WERE DEAD OR WISHED YOU COULD GO TO SLEEP AND NOT WAKE UP?: NO

## 2024-08-06 ASSESSMENT — PAIN SCALES - GENERAL: PAINLEVEL: 0-NO PAIN

## 2024-08-06 NOTE — PROGRESS NOTES
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   -has chronic pain related to past surgery for his previous cancer  -Last saw ENT in 3/2023, will follow up again in 6 months. At this appointment, he showed no evidence of disease  -PEG tube exchanged periodically; ENT recommends this soon.   -Patient takes nothing in by mouth due to vocal cord paralysis.    Subjective   HISTORY OF PRESENT ILLNESS: Blane Sharma is a 59 yo male with PMHx of recurrent laryngeal papillomatosis, pain , ETOH abuse (in recovery), and SCC of vocal cord.      He presents to supportive oncology clinic for follow up for pain and symptom management.     Pt presents for follow up alone today. Recently spent 5 days inpatient for PNA, still recovering. Has pain in his throat, dries out quickly, keeps biotene in a squirt bottle to use through out the day. Taking oxycodone 10mg about 4x/day, methocarbamol daily at bed time. Moving bowels frequently, they have been looser than usual d/t antibiotics. No N/V/reflux. Has lost about 40 lbs since May, feels like he can't gain any weight back. Will reach out to RD, who he was previously following with, currently doing 6 cans/day. Mood has been okay, \"could be better.\" Sleeping when he can, falls asleep in his office chair often, takes a lot of naps. Energy levels are stable. Breathing has been worse since PNA diagnosis.     Pain Assessment:  Pain Score: 5  Location:  throat    Symptom Assessment:  Pain:a little  Headache: none  Dizziness:none  Lack of energy: somewhat   Difficulty sleeping: a little   Worrying: none  Anxiety: none  Depression: none  Pain in mouth/swallowing: a little  Dry mouth: a little  Taste changes: none  Shortness of breath: a little "   Lack of appetite: none   Nausea: none  Vomiting: none  Constipation: none  Diarrhea: none  Sore muscles: none  Numbness or tingling in hands/feet/other: somewhat  Weight loss: very much      Information obtained from: interview of patient  ______________________________________________________________________        Objective                PHYSICAL EXAMINATION   Vital Signs:   Vital signs reviewed  Vitals:    08/06/24 1032   BP: 105/74   Pulse: 91   Resp: 17   Temp: 36.7 °C (98.1 °F)   SpO2: 94%     Pain Score:  5     Physical Exam  Vitals reviewed.   Constitutional:       Appearance: Normal appearance.   HENT:      Head: Normocephalic.   Cardiovascular:      Rate and Rhythm: Normal rate.   Pulmonary:      Effort: Pulmonary effort is normal.   Abdominal:      Palpations: Abdomen is soft.   Musculoskeletal:         General: Normal range of motion.   Skin:     General: Skin is warm and dry.      Coloration: Skin is pale.   Neurological:      General: No focal deficit present.      Mental Status: He is alert and oriented to person, place, and time.   Psychiatric:         Mood and Affect: Mood normal.         Judgment: Judgment normal.       ASSESSMENT/PLAN    Pain  Pain is: post-operative  Type: neuropathic  Pain control: well-controlled  Home regimen:   - Continue Oxycodone 10 mg by mouth q6 as needed. Rx sent today.  - Continue 900 mg gabapentin liquid 3 times a day. Rx sent for fill on 8/16.   - Continue 500mg Methocarbamol 1-2 tablets as needed for muscle spasms. Usually taking 1000mg BID.  Intolerances/previously tried:   - Stopped 10mg Baclofen, pt reported urinary retention     Opioid Use  Medication Management:   - OARRS report reviewed with no aberrant behavior; consistent with  prescriptions/records and patient history  - MED 60.  Overdose Risk Score 280.   This has been discussed with patient.   - We will continue to closely monitor the patient for signs of prescription misuse including UDS, OARRS  review and subjective reports at each visit.  - no concurrent benzodiazepine use   - I am a provider who either is or has consulted and collaborated with a provider certified in Hospice and Palliative Medicine and have conducted a face-face visit and examination for this patient.  - Routine Urine Drug Screen completed: 7/10/23, (+) THC, appropriately positive for opioids and negative for illicit substances  - Controlled Substance Agreement completed: 4/17/23  - Specifically discussed that controlled substance prescriptions will only be provided by our group as outlined in the completed agreement  - Prescribed naloxone: pt declined   - Red Flags: history of ETOH misuse     Constipation  At risk for constipation related to opioids,  currently not constipated  Home regimen:   -continue 1/2 cap of miralax daily PRN    Altered Mood  Chronic depression related to  psychosocial issues outside of cancer treatment     controlled with home regimen  Home regimen:   -Has a support dog. Feels supported by fiance and daughter.   - Has refused offer for evaluation by psychiatry at previous visit.  - His velma is a source of support. Refer to  PRN    Appetite/ Nutrition  Recent 40 lb weight loss since hospitalized for PNA twice this year  -continue TF 6 cans/day  -will reach out to [facility], Elvin or Natacha, to reconnect pt with their services     [facility]  -s/p antibiotic treatment for PNA in January 2024, July 2024  -encouraged pt to establish with new PCP, will make referral today  -if no improvement next visit, consider pulmonology referral         Next Follow-Up Visit:  Return to clinic in 3 months    Signature and billing  Medical complexity was moderate level due to due to complexity of problems, extensive data review, and high risk of management/treatment.  Time was spent on the following: Time Directly with Patient/Family/Caregiver, Documentation Time. Total time spent: 35 mins     Some elements copied from my note on 4/22/24,  the elements have been updated and all reflect current decision making from today, 8/6/2024.      Plan of Care discussed with: Patient    SIGNATURE: CARLO Barlow-CNP    Contact information:  Supportive and Palliative Oncology  Monday-Friday 8 AM-5 PM  Phone:  [phone], press option #5, then option #1.   Or Epic Secure Chat

## 2024-08-09 LAB — CARBOXYTHC UR-MCNC: >500 NG/ML

## 2024-08-11 LAB
6MAM UR CFM-MCNC: <25 NG/ML
CODEINE UR CFM-MCNC: <50 NG/ML
HYDROCODONE CTO UR CFM-MCNC: <25 NG/ML
HYDROMORPHONE UR CFM-MCNC: <25 NG/ML
MORPHINE UR CFM-MCNC: <50 NG/ML
NORHYDROCODONE UR CFM-MCNC: <25 NG/ML
NOROXYCODONE UR CFM-MCNC: 695 NG/ML
OXYCODONE UR CFM-MCNC: 274 NG/ML
OXYMORPHONE UR CFM-MCNC: 1390 NG/ML

## 2024-08-30 ENCOUNTER — TELEPHONE (OUTPATIENT)
Dept: ADMISSION | Facility: HOSPITAL | Age: 59
End: 2024-08-30
Payer: COMMERCIAL

## 2024-08-30 DIAGNOSIS — G89.3 CANCER RELATED PAIN: ICD-10-CM

## 2024-08-30 RX ORDER — OXYCODONE HYDROCHLORIDE 10 MG/1
10 TABLET ORAL EVERY 6 HOURS PRN
Qty: 120 TABLET | Refills: 0 | Status: SHIPPED | OUTPATIENT
Start: 2024-08-30 | End: 2024-09-29

## 2024-08-30 NOTE — TELEPHONE ENCOUNTER
Blane Sharma called the refill line for Oxycodone 10mg. Would like refills to be sent to [facility] pharmacy on file. Message sent to Palliative Care team to send in.     vascular called x 3 to come  patient for test

## 2024-09-16 NOTE — PROGRESS NOTES
Provider Impressions     No evidence of any recurrence of the patient's previously treated laryngeal cancer.      Dysphagia for which the patient is PEG dependent. The PEG tube was changed today.     Borderline airway obstruction. Right vocal cord immobility. This seems to be stable.     Hypothyroidism for which he is on Synthroid.      I will see him at his regular appointment.      Chief Complaint     Follow-up status post treatment of laryngeal cancer      History of Present Illness    This patient had a T2 N0 lesion of his right posterior glottic larynx. This was diagnosed back in April of 2011. Prior to that he had been treated for quite some time for laryngeal papillomas. The treatment for his laryngeal cancer was radiation as well as chemotherapy. He was found in 2013 to have a right vocal cord immobility. He was found to be hypothyroid and is on Synthroid. His last TSH in September 2022 was normal. He also had increasing swallowing difficulty and eventually this led to a peg that was inserted in September 2017. He really does not have any mouth intake. I sent him for a scan that was done on May 7, 2019. I personally reviewed that scan. I cannot appreciate anything worrisome. He is here today to have his PEG tube changed.  Since I last saw him he has had 2 episodes of pneumonia.  He has been strict about not having anything by mouth.  He eats strictly with his PEG tube.    Physical Exam    Examination of the oral cavity and oropharynx is within normal limits.  There is no evidence of any worrisome lesions.  There is good mobility of the tongue and palate.  Palpation of the parotid, neck, thyroid feel fails to show any worrisome masses or adenopathies.  He does have some induration.    A flexible laryngoscopy was carried out. Under topical Xylocaine and Arthur-Synephrine the scope was introduced through the nostril. The nasopharynx, base of tongue, hypopharynx, and larynx are visualized.  The right vocal cord is  immobile.  There is some movement on the left side.  His airway is probably around 5 mm.  This has not changed.  There is no worrisome lesions.    The examination was limited to the PEG tube.  It is definitely damaged. After verbal consent the old tube was removed and using a capsule dome preloaded replacement kit the tube was replaced without any difficulty. This was well-tolerated.

## 2024-09-17 ENCOUNTER — LAB (OUTPATIENT)
Dept: LAB | Facility: HOSPITAL | Age: 59
End: 2024-09-17
Payer: COMMERCIAL

## 2024-09-17 ENCOUNTER — OFFICE VISIT (OUTPATIENT)
Dept: OTOLARYNGOLOGY | Facility: HOSPITAL | Age: 59
End: 2024-09-17
Payer: COMMERCIAL

## 2024-09-17 VITALS — TEMPERATURE: 96.1 F | WEIGHT: 164.2 LBS | BODY MASS INDEX: 22.24 KG/M2 | HEIGHT: 72 IN

## 2024-09-17 DIAGNOSIS — R13.12 OROPHARYNGEAL DYSPHAGIA: ICD-10-CM

## 2024-09-17 DIAGNOSIS — J98.8 AIRWAY OBSTRUCTION: ICD-10-CM

## 2024-09-17 DIAGNOSIS — C32.0 MALIGNANT NEOPLASM OF GLOTTIS (MULTI): Primary | ICD-10-CM

## 2024-09-17 DIAGNOSIS — E03.9 ACQUIRED HYPOTHYROIDISM: ICD-10-CM

## 2024-09-17 LAB — TSH SERPL-ACNC: 0.72 MIU/L (ref 0.44–3.98)

## 2024-09-17 PROCEDURE — 99214 OFFICE O/P EST MOD 30 MIN: CPT | Mod: 25 | Performed by: OTOLARYNGOLOGY

## 2024-09-17 PROCEDURE — 84443 ASSAY THYROID STIM HORMONE: CPT

## 2024-09-17 PROCEDURE — 31575 DIAGNOSTIC LARYNGOSCOPY: CPT | Performed by: OTOLARYNGOLOGY

## 2024-09-17 PROCEDURE — 36415 COLL VENOUS BLD VENIPUNCTURE: CPT

## 2024-09-17 ASSESSMENT — PAIN SCALES - GENERAL: PAINLEVEL: 0-NO PAIN

## 2024-09-17 ASSESSMENT — PATIENT HEALTH QUESTIONNAIRE - PHQ9
SUM OF ALL RESPONSES TO PHQ9 QUESTIONS 1 AND 2: 0
2. FEELING DOWN, DEPRESSED OR HOPELESS: NOT AT ALL
1. LITTLE INTEREST OR PLEASURE IN DOING THINGS: NOT AT ALL

## 2024-10-01 ENCOUNTER — TELEPHONE (OUTPATIENT)
Dept: HEMATOLOGY/ONCOLOGY | Facility: HOSPITAL | Age: 59
End: 2024-10-01
Payer: COMMERCIAL

## 2024-10-01 DIAGNOSIS — G89.3 CANCER RELATED PAIN: ICD-10-CM

## 2024-10-01 RX ORDER — OXYCODONE HYDROCHLORIDE 10 MG/1
10 TABLET ORAL EVERY 6 HOURS PRN
Qty: 120 TABLET | Refills: 0 | Status: SHIPPED | OUTPATIENT
Start: 2024-10-01 | End: 2024-10-31

## 2024-10-01 NOTE — TELEPHONE ENCOUNTER
Refill request received for Oxycodone 10mg  Preferred pharmacy is [facility] at [address] in [place].  Message sent to Palliative Care team.

## 2024-10-01 NOTE — TELEPHONE ENCOUNTER
OARRS report reviewed and reflects  prescription history, no aberrancy noted. Per OARRS, patient last filled Oxycodone 10mg #120/30 on 9/5/24 and is due for refill on 10/4/24. Per last visit with Mona Montanez CNP on 8/6/24 patient to continue Oxycodone 10mg. Patient with follow up visit scheduled with Mona Montanez CNP on 11/2/24. Patient updated that medication will be sent to [facility] for fill on 10/4/24 via . Refill request routed to provider.

## 2024-10-31 ENCOUNTER — TELEPHONE (OUTPATIENT)
Dept: ADMISSION | Facility: HOSPITAL | Age: 59
End: 2024-10-31
Payer: COMMERCIAL

## 2024-10-31 DIAGNOSIS — G89.3 CANCER RELATED PAIN: ICD-10-CM

## 2024-10-31 RX ORDER — OXYCODONE HYDROCHLORIDE 10 MG/1
10 TABLET ORAL EVERY 6 HOURS PRN
Qty: 120 TABLET | Refills: 0 | Status: SHIPPED | OUTPATIENT
Start: 2024-10-31 | End: 2024-11-30

## 2024-11-04 ENCOUNTER — APPOINTMENT (OUTPATIENT)
Dept: PRIMARY CARE | Facility: CLINIC | Age: 59
End: 2024-11-04
Payer: COMMERCIAL

## 2024-11-12 ENCOUNTER — OFFICE VISIT (OUTPATIENT)
Dept: PALLIATIVE MEDICINE | Facility: CLINIC | Age: 59
End: 2024-11-12
Payer: COMMERCIAL

## 2024-11-12 VITALS
SYSTOLIC BLOOD PRESSURE: 124 MMHG | BODY MASS INDEX: 21.93 KG/M2 | OXYGEN SATURATION: 95 % | HEART RATE: 92 BPM | TEMPERATURE: 97.3 F | WEIGHT: 161.71 LBS | RESPIRATION RATE: 17 BRPM | DIASTOLIC BLOOD PRESSURE: 84 MMHG

## 2024-11-12 DIAGNOSIS — Z79.891 ENCOUNTER FOR MONITORING OPIOID MAINTENANCE THERAPY: ICD-10-CM

## 2024-11-12 DIAGNOSIS — Z51.81 ENCOUNTER FOR MONITORING OPIOID MAINTENANCE THERAPY: ICD-10-CM

## 2024-11-12 DIAGNOSIS — M79.2 NEUROPATHIC PAIN: ICD-10-CM

## 2024-11-12 DIAGNOSIS — Z51.5 PALLIATIVE CARE ENCOUNTER: Primary | ICD-10-CM

## 2024-11-12 DIAGNOSIS — G89.3 CANCER RELATED PAIN: ICD-10-CM

## 2024-11-12 PROCEDURE — 3079F DIAST BP 80-89 MM HG: CPT | Performed by: NURSE PRACTITIONER

## 2024-11-12 PROCEDURE — 99213 OFFICE O/P EST LOW 20 MIN: CPT | Performed by: NURSE PRACTITIONER

## 2024-11-12 PROCEDURE — 3074F SYST BP LT 130 MM HG: CPT | Performed by: NURSE PRACTITIONER

## 2024-11-12 RX ORDER — GABAPENTIN 250 MG/5ML
900 SOLUTION ORAL 3 TIMES DAILY
Qty: 1620 ML | Refills: 3 | Status: SHIPPED | OUTPATIENT
Start: 2024-11-12 | End: 2024-12-12

## 2024-11-12 RX ORDER — OXYCODONE HYDROCHLORIDE 10 MG/1
10 TABLET ORAL EVERY 6 HOURS PRN
Qty: 120 TABLET | Refills: 0 | Status: SHIPPED | OUTPATIENT
Start: 2024-11-12 | End: 2024-12-12

## 2024-11-12 RX ORDER — NALOXONE HYDROCHLORIDE 4 MG/.1ML
4 SPRAY NASAL AS NEEDED
Qty: 2 EACH | Refills: 0 | Status: SHIPPED | OUTPATIENT
Start: 2024-11-12 | End: 2024-11-13

## 2024-11-12 ASSESSMENT — PATIENT HEALTH QUESTIONNAIRE - PHQ9
1. LITTLE INTEREST OR PLEASURE IN DOING THINGS: NOT AT ALL
2. FEELING DOWN, DEPRESSED OR HOPELESS: NOT AT ALL
SUM OF ALL RESPONSES TO PHQ9 QUESTIONS 1 AND 2: 0

## 2024-11-12 ASSESSMENT — COLUMBIA-SUICIDE SEVERITY RATING SCALE - C-SSRS
2. HAVE YOU ACTUALLY HAD ANY THOUGHTS OF KILLING YOURSELF?: NO
1. IN THE PAST MONTH, HAVE YOU WISHED YOU WERE DEAD OR WISHED YOU COULD GO TO SLEEP AND NOT WAKE UP?: NO
6. HAVE YOU EVER DONE ANYTHING, STARTED TO DO ANYTHING, OR PREPARED TO DO ANYTHING TO END YOUR LIFE?: NO

## 2024-11-12 ASSESSMENT — PAIN SCALES - GENERAL: PAINLEVEL_OUTOF10: 0-NO PAIN

## 2024-11-12 NOTE — PROGRESS NOTES
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   -has chronic pain related to past surgery for his previous cancer  -Last saw ENT in 3/2023, will follow up again in 6 months. At this appointment, he showed no evidence of disease  -PEG tube exchanged periodically; ENT recommends this soon.   -Patient takes nothing in by mouth due to vocal cord paralysis.    Subjective   HISTORY OF PRESENT ILLNESS: Blane Sharma is a 57 yo male with PMHx of recurrent laryngeal papillomatosis, pain , ETOH abuse (in recovery), and SCC of vocal cord.      He presents to supportive oncology clinic for follow up for pain and symptom management.     Pt presents for follow up alone today. Pain has been a bit more up and down lately, taking oxycodone about 2-3x/day, hasn't needed robaxin much, continues on gabapentin 900mg tid. Moving bowels regularly. No N/V. Working with RD, recently changed nutritional supplements, feels he's doing better with them, but discouraged that he still isn't gaining weight. Mood is stable. Sleeping okay.    Pain Assessment:  Pain Score: 5  Location:  throat    Symptom Assessment:  Pain:a little  Headache: none  Dizziness:none  Lack of energy: somewhat   Difficulty sleeping: a little   Worrying: none  Anxiety: none  Depression: none  Pain in mouth/swallowing: a little  Dry mouth: a little  Taste changes: none  Shortness of breath: a little   Lack of appetite: none   Nausea: none  Vomiting: none  Constipation: none  Diarrhea: none  Sore muscles: none  Numbness or tingling in hands/feet/other: somewhat  Weight loss: very much      Information obtained from: interview of patient  ______________________________________________________________________        Objective                 PHYSICAL EXAMINATION   Vital Signs:   Vital signs reviewed  Vitals:    11/12/24 0958   BP: 124/84   Pulse: 92   Resp: 17   Temp: 36.3 °C (97.3 °F)   SpO2: 95%     Pain Score:  5     Physical Exam  Vitals reviewed.   Constitutional:       Appearance: Normal appearance.   HENT:      Head: Normocephalic.   Cardiovascular:      Rate and Rhythm: Normal rate.   Pulmonary:      Effort: Pulmonary effort is normal.   Abdominal:      Palpations: Abdomen is soft.   Musculoskeletal:         General: Normal range of motion.   Skin:     General: Skin is warm and dry.      Coloration: Skin is pale.   Neurological:      General: No focal deficit present.      Mental Status: He is alert and oriented to person, place, and time.   Psychiatric:         Mood and Affect: Mood normal.         Judgment: Judgment normal.       ASSESSMENT/PLAN    Pain  Pain is: post-operative  Type: neuropathic  Pain control: well-controlled  Home regimen:   - Continue Oxycodone 10 mg by mouth q6 as needed. Rx sent for fill on 12/1.   - Continue 900 mg gabapentin liquid 3 times a day. Rx sent for fill on 11/13.   - Continue 500mg Methocarbamol 1-2 tablets as needed for muscle spasms. Usually taking 1000mg BID.  Intolerances/previously tried:   - Stopped 10mg Baclofen, pt reported urinary retention     Opioid Use  Medication Management:   - OARRS report reviewed with no aberrant behavior; consistent with  prescriptions/records and patient history  - MED 60.  Overdose Risk Score 280.   This has been discussed with patient.   - We will continue to closely monitor the patient for signs of prescription misuse including UDS, OARRS review and subjective reports at each visit.  - no concurrent benzodiazepine use   - I am a provider who either is or has consulted and collaborated with a provider certified in Hospice and Palliative Medicine and have conducted a face-face visit and examination for this patient.  - Routine Urine Drug Screen completed:  7/10/23, (+) THC, appropriately positive for opioids and negative for illicit substances  - Controlled Substance Agreement completed: 11/12/24  - Specifically discussed that controlled substance prescriptions will only be provided by our group as outlined in the completed agreement  - Prescribed naloxone: 11/12/24  - Red Flags: history of ETOH misuse     Constipation  At risk for constipation related to opioids,  currently not constipated  Home regimen:   -continue 1/2 cap of miralax daily PRN    Altered Mood  Chronic depression related to  psychosocial issues outside of cancer treatment     controlled with home regimen  Home regimen:   -Has a support dog. Feels supported by fiance and daughter.   - Has refused offer for evaluation by psychiatry at previous visit.  - His velma is a source of support. Refer to  PRN    Appetite/ Nutrition  Recent 40 lb weight loss since hospitalized for PNA twice this year  -continue TF 6 cans/day  -continue to follow with Elvin GASPAR  -s/p antibiotic treatment for PNA in January 2024, July 2024  -encouraged pt to establish with new PCP, will make referral today  -improved        Next Follow-Up Visit:  Return to clinic in 3 months    Signature and billing  Medical complexity was low level due to due to complexity of problems, extensive data review, and high risk of management/treatment.  Time was spent on the following: Time Directly with Patient/Family/Caregiver, Documentation Time. Total time spent: 25 mins     Some elements copied from my note on 8/6/24, the elements have been updated and all reflect current decision making from today, 11/12/2024.      Plan of Care discussed with: Patient    SIGNATURE: CARLO Barlow-CNP    Contact information:  Supportive and Palliative Oncology  Monday-Friday 8 AM-5 PM  Phone:  [phone], press option #5, then option #1.   Or Epic Secure Chat

## 2024-12-04 ENCOUNTER — TELEPHONE (OUTPATIENT)
Dept: ADMISSION | Facility: HOSPITAL | Age: 59
End: 2024-12-04
Payer: COMMERCIAL

## 2024-12-04 DIAGNOSIS — G89.3 CANCER RELATED PAIN: ICD-10-CM

## 2024-12-04 RX ORDER — OXYCODONE HYDROCHLORIDE 10 MG/1
10 TABLET ORAL EVERY 6 HOURS PRN
Qty: 120 TABLET | Refills: 0 | Status: SHIPPED | OUTPATIENT
Start: 2024-12-04 | End: 2025-01-03

## 2024-12-04 NOTE — TELEPHONE ENCOUNTER
Pt is requesting a refill on his Oxycodone 10mg to be sent to [facility]. Message sent to the team.

## 2024-12-04 NOTE — TELEPHONE ENCOUNTER
Script already sent to pharmacy for fill 12/1/24. I called pharmacy to verify they have in stock and can fill today. They do not see script on file and request new one be sent. OARRS report reviewed and reflects  prescription history, no aberrancy noted. Per OARRS, patient last filled #120/30 Oxycodone 10mg on 11/2/24. Per last visit with Mona Montanez CNP on 11/12/24 patient to continue Oxycodone 10mg Q6H PRN. Patient with follow up visit scheduled with Mona Montanez CNP on 2/11/24. Patient updated that medication will be sent to [facility]. Refill request routed to provider.

## 2024-12-09 ENCOUNTER — APPOINTMENT (OUTPATIENT)
Dept: PRIMARY CARE | Facility: CLINIC | Age: 59
End: 2024-12-09
Payer: COMMERCIAL

## 2024-12-19 ENCOUNTER — APPOINTMENT (OUTPATIENT)
Dept: PRIMARY CARE | Facility: CLINIC | Age: 59
End: 2024-12-19
Payer: COMMERCIAL

## 2024-12-30 ENCOUNTER — TELEPHONE (OUTPATIENT)
Dept: ADMISSION | Facility: HOSPITAL | Age: 59
End: 2024-12-30
Payer: COMMERCIAL

## 2024-12-30 DIAGNOSIS — G89.3 CANCER RELATED PAIN: ICD-10-CM

## 2024-12-30 RX ORDER — OXYCODONE HYDROCHLORIDE 10 MG/1
10 TABLET ORAL EVERY 6 HOURS PRN
Qty: 120 TABLET | Refills: 0 | Status: SHIPPED | OUTPATIENT
Start: 2024-12-30 | End: 2025-01-29

## 2024-12-30 NOTE — TELEPHONE ENCOUNTER
Pt is requesting a refill of oxycodone 10mg q6 prn, #120.  Last prescription was written 12/4/24.  [facility] [place].    OARRS report reviewed and reflects  prescription history, no aberrancy noted. Per OARRS, patient last filled Oxycodone 10 mg 12/4/24, 30 day supply. Per last visit with Mona Montanez 11/112/24 patient to continue medication. Patient with follow up visit scheduled with Melody 2/11/25. Patient updated that medication will be sent to [facility]. Refill request routed to provider.

## 2025-01-07 ENCOUNTER — NUTRITION (OUTPATIENT)
Dept: HEMATOLOGY/ONCOLOGY | Facility: HOSPITAL | Age: 60
End: 2025-01-07
Payer: COMMERCIAL

## 2025-01-07 VITALS — BODY MASS INDEX: 21.93 KG/M2 | HEIGHT: 72 IN

## 2025-01-10 ENCOUNTER — NUTRITION (OUTPATIENT)
Dept: HEMATOLOGY/ONCOLOGY | Facility: CLINIC | Age: 60
End: 2025-01-10
Payer: COMMERCIAL

## 2025-01-13 NOTE — PROGRESS NOTES
"NUTRITION Follow-Up NOTE    Nutrition Assessment     Reason for Visit:  Blane Sharma is a 59 y.o. male who presents for SCC R vocal cord  Completed concurrent chemoradiation 7/2011  TF dependent 2/2 vocal cord paralysis   1/7- Pt called into dietitian office requesting TF change to all Nutren 2.0.   PEG changed 9/16/24 per Dr Lipscomb's note  Pt currently on a combination of Osmolite 1.5 and Nutren 2.0 formulas    Visit Type: Clinical Nutrition, Oncology, Telephone Visit:  A telephone visit (audio only) between the patient (at the distant site) and the provider (at the originating site) was utilized to provide this telehealth service.    Verbal Consent for Encounter: Verbal consent was requested and obtained from patient on this date for a telehealth visit.      Lab Results   Component Value Date/Time    GLUCOSE 136 (H) 06/04/2024 0535     06/04/2024 0535    K 3.5 06/04/2024 0535    CL 99 06/04/2024 0535    CO2 31 06/04/2024 0535    ANIONGAP 12 06/04/2024 0535    BUN 15 06/04/2024 0535    CREATININE 0.73 06/04/2024 0535    EGFR >90 06/04/2024 0535    CALCIUM 9.4 06/04/2024 0535    ALBUMIN 3.4 06/04/2024 0535    ALKPHOS 46 06/04/2024 0535    PROT 8.2 06/04/2024 0535    AST 39 06/04/2024 0535    BILITOT 0.3 06/04/2024 0535    ALT 60 (H) 06/04/2024 0535    MG 2.18 06/02/2024 0636    PHOS 3.5 06/02/2024 0636     No results found for: \"VITD25\"    Anthropometrics:  Anthropometrics  Height: 182.9 cm (6' 0.01\")  Weight:  (11/12 @ [facility] pallative care visit 73.4 kg)  IBW/kg (Dietitian Calculated): 80.9 kg  Weight Change  Weight History / % Weight Change: pt was maintaining weight in 180s# this year dropped in June, several hospitalizations. Frustrated he cannot gain weight back. Weight has been stable since last RDN intervention in August when TF increased        Wt Readings from Last 10 Encounters:   11/12/24 73.4 kg (161 lb 11.3 oz)   09/17/24 74.5 kg (164 lb 3.2 oz)   08/06/24 72.7 kg (160 lb 4.4 oz) " Patient reports fear of the people in the wait room.  Patient states she is not leaving but going to step outside for the cold air to help anxiety.     "  06/27/24 76.9 kg (169 lb 9.6 oz)   06/01/24 75.2 kg (165 lb 12.6 oz)   05/28/24 85.3 kg (188 lb)   04/22/24 81.8 kg (180 lb 5.4 oz)   01/22/24 83.8 kg (184 lb 11.9 oz)   01/17/24 85.3 kg (188 lb 0.8 oz)   10/09/23 88.3 kg (194 lb 10.7 oz)    1/7- BMI 21.9     Food And Nutrient Intake:  Food and Nutrient History  Food and Nutrient History: NPO - enteral only  Energy Intake: Good > 75 %  GI Symptoms: none (bowel regime)  Oral Problems: dysphagia                         Enteral Nutrition Intake  Enteral Nutrition Formula/Solution: Osmolite 1.5 (3) /day + Nutren 2.0 (3)/day  gravity feed (large bore feeding bags) = 2565 calories 108 grams protein                                    Nutrition Focused Physical Exam Findings: deferred phone visit                          Energy Needs  Calculated Energy Needs Using Equations  Height: 182.9 cm (6' 0.01\")  Estimated Energy Needs  Total Energy Estimated Needs (kCal): 2575 kCal (8257-7453)  Total Estimated Energy Need per Day (kCal/kg): 35 kCal/kg (35-38)  Estimated Fluid Needs  Total Fluid Estimated Needs (mL): 2575 mL  Method for Estimating Needs: 1 ml/kcal  Estimated Protein Needs  Total Protein Estimated Needs (g): 110 g  Total Protein Estimated Needs (g/kg): 1.5 g/kg        Nutrition Diagnosis   Malnutrition Diagnosis  Patient has Malnutrition Diagnosis: No    Nutrition Diagnosis  Patient has Nutrition Diagnosis: Yes  Diagnosis Status (1): Ongoing  Nutrition Diagnosis 1: Swallowing difficulity  Related to (1): SCC vocal cord, dysphagia, vocal cord paralysis  As Evidenced by (1): need for PEG for nutrition    Nutrition Interventions/Recommendations   Nutrition Prescription  Individualized Nutrition Prescription Provided for : enteral prescription    Food and Nutrition Delivery  Food and Nutrition Delivery  Enteral Nutrition: Modify composition of enteral nutrition  Total Nutrition Provided: Recommend Nutren 2.0 5.5 cartons to provide 2750 calories and 115 grams protein, " 952 ml free water. This is slightly more calories than he is currently receiving in less volume,  goal is to facilitate weight gain  Goals: Pt to infuse via gravity bags, 60 ml FWF before and after each feeding and additional 300 ml water flush TID. Additional water with med administration/flushes    Nutrition Education  Nutrition Education  Nutrition Education Content: Content related nutrition education  Goals: TF plan and daily schedule to be mailed to pt    Coordination of Care  Coordination of Nutrition Care by a Nutrition Professional  Collaboration and referral of nutrition care: Collaboration by nutrition professional with other providers  Goals: Angy Martinez - Dr Whitman office. [facility]    DME: [place] / [facility]  TF: Nutren 2.0   GOAL RATE: 5.5 cartons per day  PROVIDES:  2750 calories 115 grams protein 952 ml free water  METHOD: large bore gravity bags   COMMENTS:     1/8-   For ease of use and to limit volume for meeting est needs moving to a single formula is the goal  CMN emailed to Angy Martinez who will assist in obtaining Dr Lipscomb's signature. Once received back will fax to Francois.   Pt has an enteral order in route of current order (Osmolite 1.5 + Nutren 2.0) Confirmed with Francois/[facility]  New orders will go into effect for next shipment due in 30 days        Nutrition Monitoring and Evaluation   Food/Nutrient Related History Monitoring  Monitoring and Evaluation Plan: Enteral and parenteral nutrition intake  Enteral and Parenteral Nutrition Intake: Enteral nutrition intake  Criteria: tolerate TF at goal  Body Composition/Growth/Weight History  Monitoring and Evaluation Plan: Weight  Weight: Measured weight  Criteria: promote weight gain to baseline (180s)      Following as needed    Time Spent  Prep time on day of patient encounter: 0 minutes  Time spent directly with patient, family or caregiver: 15 minutes  Additional Time Spent on Patient Care  Activities: 25 minutes  Documentation Time: 30 minutes  Other Time Spent: 0 minutes  Total: 70 minutes

## 2025-01-21 ENCOUNTER — APPOINTMENT (OUTPATIENT)
Dept: PRIMARY CARE | Facility: CLINIC | Age: 60
End: 2025-01-21
Payer: COMMERCIAL

## 2025-01-21 VITALS
WEIGHT: 160.2 LBS | DIASTOLIC BLOOD PRESSURE: 87 MMHG | SYSTOLIC BLOOD PRESSURE: 122 MMHG | BODY MASS INDEX: 21.7 KG/M2 | HEART RATE: 96 BPM | OXYGEN SATURATION: 97 % | RESPIRATION RATE: 18 BRPM | HEIGHT: 72 IN

## 2025-01-21 DIAGNOSIS — Z00.00 ENCOUNTER FOR ANNUAL WELLNESS VISIT (AWV) IN MEDICARE PATIENT: ICD-10-CM

## 2025-01-21 DIAGNOSIS — J20.9 ACUTE BRONCHITIS, UNSPECIFIED ORGANISM: ICD-10-CM

## 2025-01-21 PROCEDURE — 3074F SYST BP LT 130 MM HG: CPT | Performed by: INTERNAL MEDICINE

## 2025-01-21 PROCEDURE — 3079F DIAST BP 80-89 MM HG: CPT | Performed by: INTERNAL MEDICINE

## 2025-01-21 PROCEDURE — G0439 PPPS, SUBSEQ VISIT: HCPCS | Performed by: INTERNAL MEDICINE

## 2025-01-21 PROCEDURE — 3008F BODY MASS INDEX DOCD: CPT | Performed by: INTERNAL MEDICINE

## 2025-01-21 PROCEDURE — 1036F TOBACCO NON-USER: CPT | Performed by: INTERNAL MEDICINE

## 2025-01-21 PROCEDURE — 99213 OFFICE O/P EST LOW 20 MIN: CPT | Performed by: INTERNAL MEDICINE

## 2025-01-21 RX ORDER — DEXTROMETHORPHAN HBR AND GUAIFENESIN 5; 100 MG/5ML; MG/5ML
20 LIQUID ORAL 2 TIMES DAILY
Qty: 840 ML | Refills: 0 | Status: SHIPPED | OUTPATIENT
Start: 2025-01-21 | End: 2025-01-28

## 2025-01-21 RX ORDER — AMOXICILLIN AND CLAVULANATE POTASSIUM 400; 57 MG/5ML; MG/5ML
875 POWDER, FOR SUSPENSION ORAL EVERY 12 HOURS SCHEDULED
Qty: 218 ML | Refills: 0 | Status: SHIPPED | OUTPATIENT
Start: 2025-01-21 | End: 2025-01-31

## 2025-01-21 ASSESSMENT — ENCOUNTER SYMPTOMS
GASTROINTESTINAL NEGATIVE: 1
NEUROLOGICAL NEGATIVE: 1
CARDIOVASCULAR NEGATIVE: 1
MUSCULOSKELETAL NEGATIVE: 1
COUGH: 1
ENDOCRINE NEGATIVE: 1
PSYCHIATRIC NEGATIVE: 1
EYES NEGATIVE: 1

## 2025-01-21 ASSESSMENT — ACTIVITIES OF DAILY LIVING (ADL)
DOING_HOUSEWORK: INDEPENDENT
BATHING: INDEPENDENT
TAKING_MEDICATION: INDEPENDENT
MANAGING_FINANCES: INDEPENDENT
GROCERY_SHOPPING: INDEPENDENT
DRESSING: INDEPENDENT

## 2025-01-21 ASSESSMENT — PAIN SCALES - GENERAL: PAINLEVEL_OUTOF10: 0-NO PAIN

## 2025-01-21 NOTE — PROGRESS NOTES
Patient ID:   Blane Sharma is a 59 y.o. male with PMH remarkable for HTN, hypothyroidism, GERD, PEG tube, SCC of R vocal cord (dx in 2011) who presents to the office today for Follow-up and Medicare Annual Wellness Visit Subsequent.    HEALTH MAINTENANCE: Annual Medicare Wellness Physical  Last Office Visit: 6/27/2024 for hospitalization FU, establish care and FU on pneumonia. CR was ordered.  Last Labs: CBC, RFP 6/2/2024, TSH 9/17/2024, declines lipid panel  PSA Screening (starting at age 55 till 69): declines  Colonoscopy (45-75 or age 40 with 1st degree relative dx colon ca): declines  Lung cancer screening (50-78 y/o x 20 pk yr for at least 20 yrs + current smoker OR quit in last 15 years, no CT w/I last year): 5/29/2024 noting 2.1cm opacity in LLL. Poca to be infection related.   6/1/2024 ECHO showed LVSF normal.   Patient denies any fevers, chills, fatigue, lymphadenopathy, headache, chest pain, n/v/c/d, abd pain, dysuria, rash, changes in weight or appetite.   - reports that he has been spitting up a lot of phlegm lately  - declines colonoscopy, CT chest, PSA level.  - FU in 6m or sooner if needed    Past Medical History:   Diagnosis Date    Acute bronchitis 06/27/2024    Acute cholecystitis 06/27/2024    Comment on above: CHOLECYSTITIS    Cellulitis, unspecified 04/06/2020    Cellulitis of skin    Cervicalgia 08/04/2017    Cervical pain    Disease of spinal cord, unspecified 05/25/2022    Cervical myelopathy with cervical radiculopathy    Dysphagia, unspecified 03/11/2022    Dysphagia    Dyspnea on exertion 06/27/2024    Encounter for observation for other suspected diseases and conditions ruled out 09/16/2022    Observation for suspected malignant neoplasm    Gastro-esophageal reflux disease without esophagitis 09/05/2014    GERD (gastroesophageal reflux disease)    Gastrostomy malfunction (Multi) 05/31/2019    Mechanical complication of gastrostomy    Hypertension     Hypothyroidism, unspecified 09/16/2022     Acquired hypothyroidism    Low back pain, unspecified 2015    Lumbago    Malignant neoplasm of glottis (Multi) 2022    Malignant neoplasm of glottis    Neoplasm of uncertain behavior, unspecified 2019    Neoplasm of uncertain behavior    Paralysis 2024    Paralysis of vocal cords and larynx, unilateral 2022    Unilateral partial vocal cord paralysis    Personal history of other diseases of the respiratory system 2017    History of acute sinusitis    Personal history of other diseases of the respiratory system 2016    History of acute bronchitis    Personal history of other diseases of the respiratory system 2017    History of acute sinusitis    Radiation sickness, unspecified, initial encounter 2014    Complication of radiotherapy    Spinal stenosis, cervical region 10/09/2015    Cervical spinal stenosis    Tachycardia 11/15/2018    Tinea pedis 2024    Umbilical hernia 2024      Past Surgical History:   Procedure Laterality Date    NECK SURGERY  2016    Neck Surgery    OTHER SURGICAL HISTORY  2016    Laryngeal Surgery    OTHER SURGICAL HISTORY  2022    Umbilical hernia repair laparoscopic    OTHER SURGICAL HISTORY  2022    Cholecystectomy      Social History     Tobacco Use    Smoking status: Former     Current packs/day: 0.00     Average packs/day: 2.0 packs/day for 15.0 years (30.0 ttl pk-yrs)     Types: Cigarettes     Quit date:      Years since quittin.0    Smokeless tobacco: Never   Vaping Use    Vaping status: Never Used   Substance Use Topics    Alcohol use: Not Currently    Drug use: Not Currently     Types: Marijuana     Family History   Problem Relation Name Age of Onset    Iron deficiency Mother      Other (lung nodules) Mother      Cancer Other Family     Diabetes Other Family     Other (nonorganic sleep apnea) Other Family       Immunization History   Administered Date(s) Administered    Pneumococcal  "polysaccharide vaccine, 23-valent, age 2 years and older (PNEUMOVAX 23) 09/20/2015     Review of Systems   Constitutional:  Positive for fatigue.   HENT:  Positive for postnasal drip and rhinorrhea.    Eyes: Negative.    Respiratory:  Positive for cough and shortness of breath.    Cardiovascular: Negative.    Gastrointestinal: Negative.    Endocrine: Negative.    Genitourinary: Negative.    Musculoskeletal: Negative.    Skin: Negative.    Neurological: Negative.    Psychiatric/Behavioral: Negative.     All other systems reviewed and are negative.    Allergies   Allergen Reactions    Bee Pollen Anaphylaxis     bee stings    Codeine Nausea/vomiting    Morphine Unknown    Other Unknown     \"Codeine Phosphate Powd\"     Visit Vitals  /87   Pulse 96   Resp 18   Ht 1.829 m (6' 0.01\")   Wt 72.7 kg (160 lb 3.2 oz)   SpO2 97%   BMI 21.72 kg/m²   Smoking Status Former   BSA 1.92 m²     Physical Exam  Vitals reviewed. Exam conducted with a chaperone present.   Constitutional:       Appearance: Normal appearance. He is well-developed and underweight.   HENT:      Head: Normocephalic.      Right Ear: External ear normal.      Left Ear: External ear normal.      Nose: Nose normal.      Mouth/Throat:      Lips: Pink.      Mouth: Mucous membranes are moist.   Eyes:      General: Lids are normal.      Pupils: Pupils are equal, round, and reactive to light.   Neck:      Trachea: Trachea normal.      Comments: Surgical scar healed  Dysphonia d/t neck surgery  Cardiovascular:      Rate and Rhythm: Normal rate and regular rhythm.      Heart sounds: Normal heart sounds.   Pulmonary:      Effort: Pulmonary effort is normal.      Breath sounds: Decreased breath sounds and rhonchi present.   Abdominal:      General: Bowel sounds are normal.      Palpations: Abdomen is soft.      Comments: PEG tube in place   Musculoskeletal:      Cervical back: Full passive range of motion without pain.   Skin:     General: Skin is warm and moist. "   Neurological:      General: No focal deficit present.      Mental Status: He is alert and oriented to person, place, and time. Mental status is at baseline.   Psychiatric:         Attention and Perception: Attention normal.         Mood and Affect: Mood normal.         Speech: Speech normal.         Behavior: Behavior is cooperative.         Thought Content: Thought content normal.         Cognition and Memory: Cognition normal.         Judgment: Judgment normal.       Current Outpatient Medications   Medication Instructions    amoxicillin-pot clavulanate (Augmentin) 400-57 mg/5 mL suspension 875 mg, g-tube, Every 12 hours scheduled    dextromethorphan-guaifenesin (Children's Mucinex Cough) 5-100 mg/5 mL liquid 20 mL, g-tube, 2 times daily    gabapentin (NEURONTIN) 900 mg, oral, 3 times daily    levothyroxine (Synthroid, Levoxyl) 112 mcg tablet 1 tablet, Daily before breakfast    methocarbamol (ROBAXIN) 500-1,000 mg, oral, 2 times daily PRN    oxyCODONE (ROXICODONE) 10 mg, oral, Every 6 hours PRN     Lab Results   Component Value Date    WBC 12.3 (H) 06/02/2024    HGB 14.1 06/02/2024    HCT 43.1 06/02/2024     (H) 06/02/2024    ALT 60 (H) 06/04/2024    AST 39 06/04/2024     06/04/2024    K 3.5 06/04/2024    CL 99 06/04/2024    CREATININE 0.73 06/04/2024    BUN 15 06/04/2024    CO2 31 06/04/2024    TSH 0.72 09/17/2024    INR 1.4 (H) 05/31/2024       Problem List Items Addressed This Visit             ICD-10-CM    Encounter for annual wellness visit (AWV) in Medicare patient Z00.00     - declines colonoscopy, CT chest, PSA level.  - FU in 6m or sooner if needed         Acute bronchitis J20.9     - start on mucinex 600mg BID x7d  - start on augmentin 875mg BID x10d  -- liquid form for pt to put in PEG tube         Relevant Medications    amoxicillin-pot clavulanate (Augmentin) 400-57 mg/5 mL suspension    dextromethorphan-guaifenesin (Children's Mucinex Cough) 5-100 mg/5 mL liquid      --------------------  Written by Rosangela Garcia RN, acting as a scribe for Dr. May. This note accurately reflects the work and decisions made by Dr. May.     I, Dr. May, attest all medical record entries made by the scribe were under my direction and were personally dictated by me. I have reviewed the chart and agree that the record accurately reflects my performance of the history, physical exam, and assessment and plan.

## 2025-01-22 PROBLEM — J20.9 ACUTE BRONCHITIS: Status: ACTIVE | Noted: 2025-01-22

## 2025-01-22 ASSESSMENT — ENCOUNTER SYMPTOMS
FATIGUE: 1
RHINORRHEA: 1
SHORTNESS OF BREATH: 1

## 2025-01-22 NOTE — ASSESSMENT & PLAN NOTE
- start on mucinex 600mg BID x7d  - start on augmentin 875mg BID x10d  -- liquid form for pt to put in PEG tube

## 2025-01-30 ENCOUNTER — TELEPHONE (OUTPATIENT)
Dept: ADMISSION | Facility: HOSPITAL | Age: 60
End: 2025-01-30
Payer: COMMERCIAL

## 2025-01-30 DIAGNOSIS — G89.3 CANCER RELATED PAIN: ICD-10-CM

## 2025-01-30 RX ORDER — OXYCODONE HYDROCHLORIDE 10 MG/1
10 TABLET ORAL EVERY 6 HOURS PRN
Qty: 120 TABLET | Refills: 0 | Status: SHIPPED | OUTPATIENT
Start: 2025-01-30 | End: 2025-03-01

## 2025-01-30 NOTE — TELEPHONE ENCOUNTER
Pt requesting refill  Oxycodone 10mg. 1 tablet every 6 hrs prn  Pharmacy: [facility] in [place]  Last FUV 11/12, next FUV 2/11

## 2025-02-11 ENCOUNTER — APPOINTMENT (OUTPATIENT)
Dept: PALLIATIVE MEDICINE | Facility: CLINIC | Age: 60
End: 2025-02-11
Payer: COMMERCIAL

## 2025-02-11 ENCOUNTER — TELEPHONE (OUTPATIENT)
Dept: ADMISSION | Facility: HOSPITAL | Age: 60
End: 2025-02-11
Payer: COMMERCIAL

## 2025-02-11 NOTE — TELEPHONE ENCOUNTER
Patient accepted 1st available FUV at [facility] with Mona Montanez CNP on 3/4/25 at 11am. Patient denies refill needs at this time, verifies he knows how to contact the office if refills needed prior to the appointment.

## 2025-03-03 ENCOUNTER — TELEPHONE (OUTPATIENT)
Dept: ADMISSION | Facility: HOSPITAL | Age: 60
End: 2025-03-03
Payer: COMMERCIAL

## 2025-03-03 DIAGNOSIS — G89.3 CANCER RELATED PAIN: ICD-10-CM

## 2025-03-03 NOTE — TELEPHONE ENCOUNTER
Pt is requesting a refill of oxycodone 10mg q6 prn, #120.  Last prescription was written 1/30/25.  Preferred pharmacy is [facility] in [place].

## 2025-03-03 NOTE — TELEPHONE ENCOUNTER
Patient last seen by CARLO Montanez on 11/12 with plan to continue oxycodone 10mg q6h PRN. Follow up visit is scheduled for 3/4. I spoke with patient and he has enough medication to get through tomorrows appointment. Refill will be addressed at that time.

## 2025-03-04 ENCOUNTER — OFFICE VISIT (OUTPATIENT)
Dept: PALLIATIVE MEDICINE | Facility: CLINIC | Age: 60
End: 2025-03-04
Payer: COMMERCIAL

## 2025-03-04 ENCOUNTER — TELEPHONE (OUTPATIENT)
Dept: PALLIATIVE MEDICINE | Facility: CLINIC | Age: 60
End: 2025-03-04
Payer: COMMERCIAL

## 2025-03-04 VITALS
OXYGEN SATURATION: 91 % | HEART RATE: 100 BPM | BODY MASS INDEX: 21.46 KG/M2 | WEIGHT: 158.29 LBS | RESPIRATION RATE: 18 BRPM | SYSTOLIC BLOOD PRESSURE: 141 MMHG | TEMPERATURE: 97.9 F | DIASTOLIC BLOOD PRESSURE: 84 MMHG

## 2025-03-04 DIAGNOSIS — Z79.891 ENCOUNTER FOR MONITORING OPIOID MAINTENANCE THERAPY: ICD-10-CM

## 2025-03-04 DIAGNOSIS — M62.838 MUSCLE SPASM: ICD-10-CM

## 2025-03-04 DIAGNOSIS — G89.3 CANCER RELATED PAIN: ICD-10-CM

## 2025-03-04 DIAGNOSIS — G89.3 CHRONIC PAIN DUE TO NEOPLASM: ICD-10-CM

## 2025-03-04 DIAGNOSIS — Z51.81 ENCOUNTER FOR MONITORING OPIOID MAINTENANCE THERAPY: ICD-10-CM

## 2025-03-04 DIAGNOSIS — G47.00 INSOMNIA, UNSPECIFIED TYPE: ICD-10-CM

## 2025-03-04 DIAGNOSIS — M79.2 NEUROPATHIC PAIN: ICD-10-CM

## 2025-03-04 DIAGNOSIS — Z51.5 PALLIATIVE CARE ENCOUNTER: Primary | ICD-10-CM

## 2025-03-04 PROCEDURE — 99214 OFFICE O/P EST MOD 30 MIN: CPT | Performed by: NURSE PRACTITIONER

## 2025-03-04 PROCEDURE — 3079F DIAST BP 80-89 MM HG: CPT | Performed by: NURSE PRACTITIONER

## 2025-03-04 PROCEDURE — 3077F SYST BP >= 140 MM HG: CPT | Performed by: NURSE PRACTITIONER

## 2025-03-04 RX ORDER — OXYCODONE HYDROCHLORIDE 10 MG/1
10 TABLET ORAL EVERY 6 HOURS PRN
Qty: 120 TABLET | Refills: 0 | Status: SHIPPED | OUTPATIENT
Start: 2025-03-04 | End: 2025-04-03

## 2025-03-04 RX ORDER — GABAPENTIN 250 MG/5ML
900 SOLUTION ORAL 3 TIMES DAILY
Qty: 1620 ML | Refills: 3 | Status: SHIPPED | OUTPATIENT
Start: 2025-03-04 | End: 2025-04-03

## 2025-03-04 RX ORDER — BACLOFEN 10 MG/1
10 TABLET ORAL 3 TIMES DAILY PRN
Qty: 90 TABLET | Refills: 3 | Status: SHIPPED | OUTPATIENT
Start: 2025-03-04 | End: 2025-04-03

## 2025-03-04 RX ORDER — TRAZODONE HYDROCHLORIDE 50 MG/1
50 TABLET ORAL NIGHTLY
Qty: 30 TABLET | Refills: 3 | Status: SHIPPED | OUTPATIENT
Start: 2025-03-04 | End: 2025-04-03

## 2025-03-04 ASSESSMENT — PAIN SCALES - GENERAL: PAINLEVEL_OUTOF10: 3

## 2025-03-04 NOTE — PROGRESS NOTES
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   -has chronic pain related to past surgery for his previous cancer  -Last saw ENT in 3/2023, will follow up again in 6 months. At this appointment, he showed no evidence of disease  -PEG tube exchanged periodically; ENT recommends this soon.   -Patient takes nothing in by mouth due to vocal cord paralysis.    Subjective   HISTORY OF PRESENT ILLNESS: Blane Sharma is a 59 yo male with PMHx of recurrent laryngeal papillomatosis, pain , ETOH abuse (in recovery), and SCC of vocal cord.      He presents to supportive oncology clinic for follow up for pain and symptom management.     Pt presents for follow up alone today. Pain is up and down, typical for \"change of seasons\" for him. Taking oxycodone 3x/day, stopped methocarbamol because started causing itching, continues on scott 900mg tid. He is having muscle cramping, especially in his feet in the middle of the night, would like to try alternative medication. Moving bowels regularly. No N/V/reflux. Has lost a few lbs, recent changes to TF regimen per RD. Mood is stable, not sleeping well, tossing and turning a lot.     Pain Assessment:  Pain Score: 3  Location: Throat    Symptom Assessment:  Pain:a little  Headache: none  Dizziness:none  Lack of energy: somewhat   Difficulty sleeping: a little   Worrying: none  Anxiety: none  Depression: none  Pain in mouth/swallowing: a little  Dry mouth: a little  Taste changes: none  Shortness of breath: a little   Lack of appetite: none   Nausea: none  Vomiting: none  Constipation: none  Diarrhea: none  Sore muscles: none  Numbness or tingling in hands/feet/other: somewhat  Weight loss: very much      Information obtained from: interview " of patient  ______________________________________________________________________        Objective                PHYSICAL EXAMINATION   Vital Signs:   Vital signs reviewed  Visit Vitals  /84 (BP Location: Left arm, Patient Position: Sitting, BP Cuff Size: Adult long)   Pulse 100   Temp 36.6 °C (97.9 °F) (Temporal)   Resp 18   Pulse ox 88% at start of visit, recheck at visit completion was 91%    Pain Score:  3     Physical Exam  Vitals reviewed.   Constitutional:       Appearance: Normal appearance. He is ill-appearing.   HENT:      Head: Normocephalic.      Comments: Mild temporal wasting   Cardiovascular:      Rate and Rhythm: Normal rate.   Pulmonary:      Effort: Pulmonary effort is normal.   Abdominal:      Palpations: Abdomen is soft.   Musculoskeletal:         General: Normal range of motion.   Skin:     General: Skin is warm and dry.      Coloration: Skin is pale.   Neurological:      General: No focal deficit present.      Mental Status: He is alert and oriented to person, place, and time.   Psychiatric:         Mood and Affect: Mood normal.         Judgment: Judgment normal.         ASSESSMENT/PLAN    Pain  Pain is: post-operative  Type: neuropathic  Pain control: well-controlled  Home regimen:   - Continue Oxycodone 10 mg by mouth q6 as needed. Rx sent today.   - Continue 900 mg gabapentin liquid 3 times a day. Rx sent for fill on 3/10.  - pt self discontinued 500mg Methocarbamol 1-2 tablets as needed for muscle spasms, d/t itching  - start baclofen 10mg tid PRN. Rx sent today.   Intolerances/previously tried:   - Stopped 10mg Baclofen, pt reported urinary retention     Opioid Use  Medication Management:   - OARRS report reviewed with no aberrant behavior; consistent with  prescriptions/records and patient history  - MED 60.  Overdose Risk Score 280.   This has been discussed with patient.   - We will continue to closely monitor the patient for signs of prescription misuse including UDS, OARRS  review and subjective reports at each visit.  - no concurrent benzodiazepine use   - I am a provider who either is or has consulted and collaborated with a provider certified in Hospice and Palliative Medicine and have conducted a face-face visit and examination for this patient.  - Routine Urine Drug Screen completed: 7/10/23, (+) THC, appropriately positive for opioids and negative for illicit substances  - Controlled Substance Agreement completed: 11/12/24  - Specifically discussed that controlled substance prescriptions will only be provided by our group as outlined in the completed agreement  - Prescribed naloxone: 11/12/24  - Red Flags: history of ETOH misuse     Constipation  At risk for constipation related to opioids,  currently not constipated  Home regimen:   -continue 1/2 cap of miralax daily PRN    Altered Mood  Chronic depression related to  psychosocial issues outside of cancer treatment     controlled with home regimen  Home regimen:   -Has a support dog. Feels supported by fiance and daughter.   - Has refused offer for evaluation by psychiatry at previous visit.  - His velma is a source of support. Refer to  PRN    Appetite/ Nutrition  Recent 40 lb weight loss since hospitalized for PNA twice this year  -continue TF 6 cans/day  -continue to follow with [facility]  -s/p antibiotic treatment for PNA in January 2024, July 2024  -improved    Sleep  Increased frequency of insomnia   -start trazodone 50mg at bedtime. Rx sent today.         Next Follow-Up Visit:  Return to clinic in 3 months    Signature and billing  Medical complexity was moderate level due to due to complexity of problems, extensive data review, and high risk of management/treatment.  Time was spent on the following: Time Directly with Patient/Family/Caregiver, Documentation Time. Total time spent: 30 mins     Some elements copied from my note on 11/12/24, the elements have been updated and all reflect current decision  making from today, 3/4/2025.      Plan of Care discussed with: Patient    SIGNATURE: Mona Montanez, APRN-CNP    Contact information:  Supportive and Palliative Oncology  Monday-Friday 8 AM-5 PM  Phone:  [phone], press option #5, then option #1.   Or Epic Secure Chat

## 2025-03-04 NOTE — TELEPHONE ENCOUNTER
Phone call to pharmacy to see if PA needed for Trazodone and Baclofen. No PA needed, pharmacy will fill.

## 2025-03-17 NOTE — PROGRESS NOTES
Provider Impressions     No evidence of any recurrence of the patient's previously treated laryngeal cancer.      Dysphagia for which the patient is PEG dependent.      Borderline airway obstruction. Right vocal cord immobility. This seems to be stable.     Hypothyroidism for which he is on Synthroid.      I will see him in 6 months.      Chief Complaint     Follow-up status post treatment of laryngeal cancer      History of Present Illness    This patient had a T2 N0 lesion of his right posterior glottic larynx. This was diagnosed back in April of 2011. Prior to that he had been treated for quite some time for laryngeal papillomas. The treatment for his laryngeal cancer was radiation as well as chemotherapy. He was found in 2013 to have a right vocal cord immobility. He was found to be hypothyroid and is on Synthroid. His last TSH in September 2024 was normal. He also had increasing swallowing difficulty and eventually this led to a peg that was inserted in September 2017. He really does not have any mouth intake. I sent him for a scan that was done on May 7, 2019. I personally reviewed that scan. I cannot appreciate anything worrisome.   He has been strict about not having anything by mouth.  He eats strictly with his PEG tube.  He had a chest x-ray in June 2024 that showed probable infection.    Physical Exam    Examination of the oral cavity and oropharynx is within normal limits.  There is no evidence of any worrisome lesions.  There is good mobility of the tongue and palate.  Palpation of the parotid, neck, thyroid feel fails to show any worrisome masses or adenopathies.  He does have some significant induration.    A flexible laryngoscopy was carried out. Under topical Xylocaine and Arthur-Synephrine the scope was introduced through the nostril. The nasopharynx, base of tongue, hypopharynx, and larynx are visualized.  The right vocal cord is immobile.  There is some movement on the left side.  His airway is  probably around 5 mm.  This has not changed.  There is no worrisome lesions.    The PEG tube was found to be in good condition.

## 2025-03-18 ENCOUNTER — OFFICE VISIT (OUTPATIENT)
Dept: OTOLARYNGOLOGY | Facility: HOSPITAL | Age: 60
End: 2025-03-18
Payer: COMMERCIAL

## 2025-03-18 ENCOUNTER — TELEPHONE (OUTPATIENT)
Dept: ADMISSION | Facility: HOSPITAL | Age: 60
End: 2025-03-18

## 2025-03-18 VITALS — HEIGHT: 72 IN | WEIGHT: 166.8 LBS | BODY MASS INDEX: 22.59 KG/M2 | TEMPERATURE: 97.2 F

## 2025-03-18 DIAGNOSIS — R13.12 OROPHARYNGEAL DYSPHAGIA: ICD-10-CM

## 2025-03-18 DIAGNOSIS — G47.00 INSOMNIA, UNSPECIFIED TYPE: ICD-10-CM

## 2025-03-18 DIAGNOSIS — C32.0 MALIGNANT NEOPLASM OF GLOTTIS (MULTI): Primary | ICD-10-CM

## 2025-03-18 DIAGNOSIS — E03.9 ACQUIRED HYPOTHYROIDISM: ICD-10-CM

## 2025-03-18 DIAGNOSIS — J98.8 AIRWAY OBSTRUCTION: ICD-10-CM

## 2025-03-18 PROCEDURE — 1036F TOBACCO NON-USER: CPT | Performed by: OTOLARYNGOLOGY

## 2025-03-18 PROCEDURE — 3008F BODY MASS INDEX DOCD: CPT | Performed by: OTOLARYNGOLOGY

## 2025-03-18 PROCEDURE — 99213 OFFICE O/P EST LOW 20 MIN: CPT | Mod: 25 | Performed by: OTOLARYNGOLOGY

## 2025-03-18 PROCEDURE — 99213 OFFICE O/P EST LOW 20 MIN: CPT | Performed by: OTOLARYNGOLOGY

## 2025-03-18 PROCEDURE — 31575 DIAGNOSTIC LARYNGOSCOPY: CPT | Performed by: OTOLARYNGOLOGY

## 2025-03-18 RX ORDER — TRAZODONE HYDROCHLORIDE 150 MG/1
150 TABLET ORAL NIGHTLY
Qty: 30 TABLET | Refills: 3 | Status: SHIPPED | OUTPATIENT
Start: 2025-03-18 | End: 2025-07-16

## 2025-03-18 ASSESSMENT — PATIENT HEALTH QUESTIONNAIRE - PHQ9
SUM OF ALL RESPONSES TO PHQ9 QUESTIONS 1 & 2: 0
2. FEELING DOWN, DEPRESSED OR HOPELESS: NOT AT ALL
1. LITTLE INTEREST OR PLEASURE IN DOING THINGS: NOT AT ALL

## 2025-03-18 NOTE — TELEPHONE ENCOUNTER
I spoke with Blane. He has been taking trazodone 150mg for sleep and it is working well. Provider approves refilling at 150mg tabs. Refill pended.

## 2025-03-18 NOTE — TELEPHONE ENCOUNTER
Patient left a message on the refill line asking for a call from the [facility] care team to discucss increasing his Trazodone dosage.

## 2025-03-31 ENCOUNTER — TELEPHONE (OUTPATIENT)
Dept: ADMISSION | Facility: HOSPITAL | Age: 60
End: 2025-03-31
Payer: COMMERCIAL

## 2025-03-31 DIAGNOSIS — G89.3 CANCER RELATED PAIN: ICD-10-CM

## 2025-03-31 RX ORDER — OXYCODONE HYDROCHLORIDE 10 MG/1
10 TABLET ORAL EVERY 6 HOURS PRN
Qty: 120 TABLET | Refills: 0 | Status: SHIPPED | OUTPATIENT
Start: 2025-04-02 | End: 2025-05-02

## 2025-03-31 NOTE — TELEPHONE ENCOUNTER
Patient last seen by CARLO Montanez on 3/4 with plan to continue oxycodone 10mg q6h PRN. Follow up visit is scheduled for 6/10. OARRS reviewed and no aberrancy noted. Patient last filled oxycodone 10mg #120/30 days on 3/4. Prescription pended to provider to approve.

## 2025-03-31 NOTE — TELEPHONE ENCOUNTER
Refill Request  Oxycodone 10mg every 6 hrs PRN    Preferred Pharmacy  [facility] #[id number]

## 2025-05-05 ENCOUNTER — TELEPHONE (OUTPATIENT)
Dept: ADMISSION | Facility: HOSPITAL | Age: 60
End: 2025-05-05
Payer: COMMERCIAL

## 2025-05-05 DIAGNOSIS — G89.3 CANCER RELATED PAIN: ICD-10-CM

## 2025-05-05 RX ORDER — OXYCODONE HYDROCHLORIDE 10 MG/1
10 TABLET ORAL EVERY 6 HOURS PRN
Qty: 120 TABLET | Refills: 0 | Status: SHIPPED | OUTPATIENT
Start: 2025-05-05 | End: 2025-06-04

## 2025-05-05 NOTE — TELEPHONE ENCOUNTER
Medication Refill-  oxycodone      Pharmacy-  [facility] #[id number]    OARRS report reviewed and reflects  prescription history, no aberrancy noted. Per OARRS, patient last filled Oxycodone IR 10 mg 4/2/25, 30 day supply. Per last visit with Mona Montanez 3/4/25 patient to continue medication. Patient with follow up visit scheduled with Mona 6/10/25. Patient updated that medication will be sent to [facility]. Refill request routed to provider.

## 2025-06-03 ENCOUNTER — TELEPHONE (OUTPATIENT)
Dept: ADMISSION | Facility: HOSPITAL | Age: 60
End: 2025-06-03
Payer: COMMERCIAL

## 2025-06-03 ENCOUNTER — TELEPHONE (OUTPATIENT)
Dept: OTOLARYNGOLOGY | Facility: HOSPITAL | Age: 60
End: 2025-06-03
Payer: COMMERCIAL

## 2025-06-03 DIAGNOSIS — G89.3 CANCER RELATED PAIN: ICD-10-CM

## 2025-06-03 RX ORDER — OXYCODONE HYDROCHLORIDE 10 MG/1
10 TABLET ORAL EVERY 6 HOURS PRN
Qty: 120 TABLET | Refills: 0 | Status: SHIPPED | OUTPATIENT
Start: 2025-06-03 | End: 2025-07-03

## 2025-06-03 NOTE — TELEPHONE ENCOUNTER
Pt is requesting a refill of oxycodone 10mg q6 prn, #120.  Preferred pharmacy is [facility] in [place].

## 2025-06-03 NOTE — TELEPHONE ENCOUNTER
Refill pended to provider for oxycodone IR 10 mg every 6 hours 120/30.  OARRS reviewed.  Due for refill.  Patient to follow up with Mona Montanez on 6/10.

## 2025-06-10 ENCOUNTER — OFFICE VISIT (OUTPATIENT)
Dept: PALLIATIVE MEDICINE | Facility: CLINIC | Age: 60
End: 2025-06-10
Payer: COMMERCIAL

## 2025-06-10 VITALS
DIASTOLIC BLOOD PRESSURE: 84 MMHG | BODY MASS INDEX: 23.11 KG/M2 | SYSTOLIC BLOOD PRESSURE: 132 MMHG | OXYGEN SATURATION: 92 % | RESPIRATION RATE: 18 BRPM | WEIGHT: 170.42 LBS | HEART RATE: 98 BPM | TEMPERATURE: 97.5 F

## 2025-06-10 DIAGNOSIS — Z51.5 PALLIATIVE CARE ENCOUNTER: Primary | ICD-10-CM

## 2025-06-10 DIAGNOSIS — M79.2 NEUROPATHIC PAIN: ICD-10-CM

## 2025-06-10 DIAGNOSIS — G89.3 CANCER RELATED PAIN: ICD-10-CM

## 2025-06-10 DIAGNOSIS — Z51.81 ENCOUNTER FOR MONITORING OPIOID MAINTENANCE THERAPY: ICD-10-CM

## 2025-06-10 DIAGNOSIS — Z79.891 ENCOUNTER FOR MONITORING OPIOID MAINTENANCE THERAPY: ICD-10-CM

## 2025-06-10 PROCEDURE — 99213 OFFICE O/P EST LOW 20 MIN: CPT | Performed by: NURSE PRACTITIONER

## 2025-06-10 PROCEDURE — 3075F SYST BP GE 130 - 139MM HG: CPT | Performed by: NURSE PRACTITIONER

## 2025-06-10 PROCEDURE — 3079F DIAST BP 80-89 MM HG: CPT | Performed by: NURSE PRACTITIONER

## 2025-06-10 RX ORDER — GABAPENTIN 250 MG/5ML
900 SOLUTION ORAL 3 TIMES DAILY
Qty: 1620 ML | Refills: 3 | Status: SHIPPED | OUTPATIENT
Start: 2025-06-10 | End: 2025-07-10

## 2025-06-10 RX ORDER — OXYCODONE HYDROCHLORIDE 10 MG/1
10 TABLET ORAL EVERY 6 HOURS PRN
Qty: 120 TABLET | Refills: 0 | Status: SHIPPED | OUTPATIENT
Start: 2025-06-10 | End: 2025-07-10

## 2025-06-10 ASSESSMENT — PAIN SCALES - GENERAL: PAINLEVEL_OUTOF10: 0-NO PAIN

## 2025-06-10 NOTE — PROGRESS NOTES
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   -has chronic pain related to past surgery for his previous cancer  -Last saw ENT in 3/2023, will follow up again in 6 months. At this appointment, he showed no evidence of disease  -PEG tube exchanged periodically; ENT recommends this soon.   -Patient takes nothing in by mouth due to vocal cord paralysis.    Subjective   HISTORY OF PRESENT ILLNESS: Blane Sharma is a 57 yo male with PMHx of recurrent laryngeal papillomatosis, pain , ETOH abuse (in recovery), and SCC of vocal cord.      He presents to supportive oncology clinic for follow up for pain and symptom management.     Pt presents for follow up alone today. Recently had PEG tube swapped out, has been leaking, scheduled to see Dr. Lipscomb on Friday to hopefully address this. Has been having more pain at insertion site. Taking oxycodone 3-4x/day, gabapentin tid. Baclofen has also helped. Moving bowels regularly. No N/V, having increased acid reflux, taking pepcid 20mg at bedtime. Forcing self to eat. Sleeping okay, has not been taking trazodone or baclofen at bedtime, because increased oxycodone use makes him more tired. Having cough.     Pain Assessment:  Pain Score: 4  Location:  abdomen    Symptom Assessment:  Pain:a little  Headache: none  Dizziness:none  Lack of energy: somewhat   Difficulty sleeping: a little   Worrying: none  Anxiety: none  Depression: none  Pain in mouth/swallowing: a little  Dry mouth: a little  Taste changes: none  Shortness of breath: a little   Lack of appetite: none   Nausea: none  Vomiting: none  Constipation: none  Diarrhea: none  Sore muscles: none  Numbness or tingling in hands/feet/other: somewhat  Weight loss: very  much      Information obtained from: interview of patient  ______________________________________________________________________        Objective                PHYSICAL EXAMINATION   Vital Signs:   Vital signs reviewed  Visit Vitals  /84 (BP Location: Left arm, Patient Position: Sitting, BP Cuff Size: Adult long)   Pulse 98   Temp 36.4 °C (97.5 °F) (Temporal)   Resp 18   Pulse ox 88% at start of visit, recheck at visit completion was 91%    Pain Score:  3     Physical Exam  Vitals reviewed.   Constitutional:       Appearance: Normal appearance. He is ill-appearing.   HENT:      Head: Normocephalic.      Comments: Mild temporal wasting   Cardiovascular:      Rate and Rhythm: Normal rate.   Pulmonary:      Effort: Pulmonary effort is normal.   Abdominal:      Palpations: Abdomen is soft.   Musculoskeletal:         General: Normal range of motion.   Skin:     General: Skin is warm and dry.      Coloration: Skin is pale.   Neurological:      General: No focal deficit present.      Mental Status: He is alert and oriented to person, place, and time.   Psychiatric:         Mood and Affect: Mood normal.         Judgment: Judgment normal.       ASSESSMENT/PLAN    Pain  Pain is: post-operative  Type: neuropathic  Pain control: well-controlled  Home regimen:   - Continue Oxycodone 10 mg by mouth q6 as needed. Rx sent for fill on 7/3.   - Continue 900 mg gabapentin liquid 3 times a day. Rx sent today.   -continue baclofen 10mg tid PRN, previously caused urinary retention, will need to monitor   Intolerances/previously tried:   - Stopped 10mg Baclofen, pt reported urinary retention   - pt self discontinued 500mg Methocarbamol 1-2 tablets PRN,  d/t itching    Opioid Use  Medication Management:   - OARRS report reviewed with no aberrant behavior; consistent with  prescriptions/records and patient history  - MED 60.  Overdose Risk Score 300.   This has been discussed with patient.   - We will continue to closely monitor  the patient for signs of prescription misuse including UDS, OARRS review and subjective reports at each visit.  - no concurrent benzodiazepine use   - I am a provider who either is or has consulted and collaborated with a provider certified in Hospice and Palliative Medicine and have conducted a face-face visit and examination for this patient.  - Routine Urine Drug Screen completed: 8/6/24, (+) THC, appropriately positive for opioids and negative for illicit substances  - Controlled Substance Agreement completed: 11/12/24  - Specifically discussed that controlled substance prescriptions will only be provided by our group as outlined in the completed agreement  - Prescribed naloxone: 11/12/24  - Red Flags: history of ETOH misuse     Constipation  At risk for constipation related to opioids,  currently not constipated  Home regimen:   -continue 1/2 cap of miralax daily PRN    Altered Mood  Chronic depression related to psychosocial issues outside of cancer treatment    controlled with home regimen  Home regimen:   -Has a support dog. Feels supported by fiance and daughter.   - Has refused offer for evaluation by psychiatry at previous visit.  - His velma is a source of support. Refer to  PRN    Appetite/ Nutrition  Recent 40 lb weight loss since hospitalized for PNA twice this year  -continue TF 6 cans/day  -continue to follow with [facility]  -PEG tube leak, scheduled to see Dr. Lipscomb for exchange this week     [facility]  -s/p antibiotic treatment for PNA in January 2024, July 2024  -improved    Sleep  Increased frequency of insomnia   -continue trazodone 50mg at bedtime.           Next Follow-Up Visit:  Return to clinic in 3 months    Signature and billing  Medical complexity was low level due to due to complexity of problems, extensive data review, and high risk of management/treatment.  Time was spent on the following: Time Directly with Patient/Family/Caregiver, Documentation Time. Total time spent: 20 mins      Some elements copied from my note on 3/4/25, the elements have been updated and all reflect current decision making from today, 6/10/2025.      Plan of Care discussed with: Patient    SIGNATURE: CARLO Barlow-CNP    Contact information:  [facility]ive [facility] Palliative Oncology  Monday-Friday 8 AM-5 PM  Phone:  [phone], press option #5, then option #1.   Or Epic Secure Chat

## 2025-06-12 NOTE — PROGRESS NOTES
Provider Impressions     No evidence of any recurrence of the patient's previously treated laryngeal cancer.  A chest x-ray will be obtained today.     Dysphagia for which the patient is PEG dependent.  Unfortunately he has had some issues with leakage around the PEG tube that just got changed recently.  He does have symptoms of reflux and therefore I put him on a pump inhibitor.  If he does not get any better I will send him to one of my colleagues in general surgery.     Borderline airway obstruction. Right vocal cord immobility. This seems to be stable.     Hypothyroidism for which he is on Synthroid.  TSH will be obtained today.     I will see him at his regular appointment in 3 months.     Chief Complaint     Follow-up status post treatment of laryngeal cancer      History of Present Illness    This patient had a T2 N0 lesion of his right posterior glottic larynx. This was diagnosed back in April of 2011. Prior to that he had been treated for quite some time for laryngeal papillomas. The treatment for his laryngeal cancer was radiation as well as chemotherapy. He was found in 2013 to have a right vocal cord immobility. He was found to be hypothyroid and is on Synthroid. His last TSH in September 2024 was normal. He also had increasing swallowing difficulty and eventually this led to a peg that was inserted in September 2017. He really does not have any mouth intake. I sent him for a scan that was done on May 7, 2019. I personally reviewed that scan. I cannot appreciate anything worrisome.   He has been strict about not having anything by mouth.  He eats strictly with his PEG tube.  He had a chest x-ray in June 2024 that showed probable infection.  He is here today because he has had some issues with leaking around his PEG tube.  This has only happened since we changed the tube 3 months ago.  It is a capsule dome tube.    Physical Exam    The PEG tube was found to be in good condition.  He definitely has some  drainage around the tube.  The tube was tight enough.

## 2025-06-13 ENCOUNTER — APPOINTMENT (OUTPATIENT)
Dept: LAB | Facility: HOSPITAL | Age: 60
End: 2025-06-13
Payer: COMMERCIAL

## 2025-06-13 ENCOUNTER — OFFICE VISIT (OUTPATIENT)
Dept: OTOLARYNGOLOGY | Facility: HOSPITAL | Age: 60
End: 2025-06-13
Payer: COMMERCIAL

## 2025-06-13 ENCOUNTER — HOSPITAL ENCOUNTER (OUTPATIENT)
Dept: RADIOLOGY | Facility: HOSPITAL | Age: 60
Discharge: HOME | End: 2025-06-13
Payer: COMMERCIAL

## 2025-06-13 VITALS — WEIGHT: 173 LBS | HEIGHT: 72 IN | BODY MASS INDEX: 23.43 KG/M2 | TEMPERATURE: 97.7 F

## 2025-06-13 DIAGNOSIS — R13.12 OROPHARYNGEAL DYSPHAGIA: ICD-10-CM

## 2025-06-13 DIAGNOSIS — C32.0 MALIGNANT NEOPLASM OF GLOTTIS (MULTI): ICD-10-CM

## 2025-06-13 DIAGNOSIS — E03.9 ACQUIRED HYPOTHYROIDISM: ICD-10-CM

## 2025-06-13 DIAGNOSIS — C32.0 MALIGNANT NEOPLASM OF GLOTTIS (MULTI): Primary | ICD-10-CM

## 2025-06-13 DIAGNOSIS — K21.9 GASTROESOPHAGEAL REFLUX DISEASE, UNSPECIFIED WHETHER ESOPHAGITIS PRESENT: ICD-10-CM

## 2025-06-13 LAB — TSH SERPL-ACNC: 1.78 MIU/L (ref 0.44–3.98)

## 2025-06-13 PROCEDURE — 1036F TOBACCO NON-USER: CPT | Performed by: OTOLARYNGOLOGY

## 2025-06-13 PROCEDURE — 84443 ASSAY THYROID STIM HORMONE: CPT | Performed by: OTOLARYNGOLOGY

## 2025-06-13 PROCEDURE — 71046 X-RAY EXAM CHEST 2 VIEWS: CPT

## 2025-06-13 PROCEDURE — 3008F BODY MASS INDEX DOCD: CPT | Performed by: OTOLARYNGOLOGY

## 2025-06-13 PROCEDURE — 99213 OFFICE O/P EST LOW 20 MIN: CPT | Performed by: OTOLARYNGOLOGY

## 2025-06-13 PROCEDURE — 99213 OFFICE O/P EST LOW 20 MIN: CPT | Mod: 25 | Performed by: OTOLARYNGOLOGY

## 2025-06-13 PROCEDURE — 36415 COLL VENOUS BLD VENIPUNCTURE: CPT | Performed by: OTOLARYNGOLOGY

## 2025-06-13 RX ORDER — ESOMEPRAZOLE MAGNESIUM 40 MG/1
GRANULE, DELAYED RELEASE ORAL
Qty: 90 PACKET | Refills: 1 | Status: SHIPPED | OUTPATIENT
Start: 2025-06-13

## 2025-06-13 ASSESSMENT — PATIENT HEALTH QUESTIONNAIRE - PHQ9
2. FEELING DOWN, DEPRESSED OR HOPELESS: NOT AT ALL
1. LITTLE INTEREST OR PLEASURE IN DOING THINGS: NOT AT ALL
SUM OF ALL RESPONSES TO PHQ9 QUESTIONS 1 & 2: 0

## 2025-06-13 ASSESSMENT — PAIN SCALES - GENERAL: PAINLEVEL_OUTOF10: 0-NO PAIN

## 2025-06-16 ENCOUNTER — TELEPHONE (OUTPATIENT)
Dept: OTOLARYNGOLOGY | Facility: HOSPITAL | Age: 60
End: 2025-06-16
Payer: COMMERCIAL

## 2025-06-16 NOTE — TELEPHONE ENCOUNTER
I left a message on Mr. Sharma's phone telling him that the radiologist saw some hilar issues on his recent chest x-ray and suggests that we do a CT scan.  I will have my office call him and arrange for that CT scan.

## 2025-06-18 ENCOUNTER — TELEPHONE (OUTPATIENT)
Dept: OTOLARYNGOLOGY | Facility: HOSPITAL | Age: 60
End: 2025-06-18
Payer: COMMERCIAL

## 2025-06-18 ENCOUNTER — TELEPHONE (OUTPATIENT)
Dept: OTOLARYNGOLOGY | Facility: CLINIC | Age: 60
End: 2025-06-18
Payer: COMMERCIAL

## 2025-06-18 DIAGNOSIS — R93.89 ABNORMAL CHEST X-RAY: ICD-10-CM

## 2025-06-18 DIAGNOSIS — C32.0 MALIGNANT NEOPLASM OF GLOTTIS (MULTI): ICD-10-CM

## 2025-06-18 NOTE — TELEPHONE ENCOUNTER
----- Message from Gary Lipscomb sent at 6/16/2025 12:18 PM EDT -----  I left a message on his phone but please go ahead and set him up for a CT scan of his chest.  His chest x-ray was abnormal.  Thanks  ----- Message -----  From: Interface, Radiology Results In  Sent: 6/13/2025   9:57 AM EDT  To: Gary Lipscomb MD

## 2025-06-18 NOTE — TELEPHONE ENCOUNTER
I tried to call Mr. Sharma and got his voice mail.  I left a message that I called and asked that he please call me back.  I will also try him later today as well.    Addendum at 1 p.m.:  Blane and I connected.  We arranged the CT chest at [facility] on June 30th at 11 a.m.  Apt information mailed to him.

## 2025-06-27 DIAGNOSIS — I10 HYPERTENSION: Primary | ICD-10-CM

## 2025-06-30 ENCOUNTER — HOSPITAL ENCOUNTER (OUTPATIENT)
Dept: RADIOLOGY | Facility: HOSPITAL | Age: 60
Discharge: HOME | End: 2025-06-30
Payer: COMMERCIAL

## 2025-06-30 DIAGNOSIS — R93.89 ABNORMAL CHEST X-RAY: ICD-10-CM

## 2025-06-30 DIAGNOSIS — C32.0 MALIGNANT NEOPLASM OF GLOTTIS (MULTI): ICD-10-CM

## 2025-06-30 DIAGNOSIS — I10 HYPERTENSION: ICD-10-CM

## 2025-06-30 LAB
CREAT SERPL-MCNC: 0.59 MG/DL (ref 0.5–1.3)
EGFRCR SERPLBLD CKD-EPI 2021: >90 ML/MIN/1.73M*2

## 2025-06-30 PROCEDURE — 71260 CT THORAX DX C+: CPT

## 2025-06-30 PROCEDURE — 36415 COLL VENOUS BLD VENIPUNCTURE: CPT | Performed by: OTOLARYNGOLOGY

## 2025-06-30 PROCEDURE — 71260 CT THORAX DX C+: CPT | Performed by: RADIOLOGY

## 2025-06-30 PROCEDURE — 2550000001 HC RX 255 CONTRASTS: Performed by: OTOLARYNGOLOGY

## 2025-06-30 PROCEDURE — 82565 ASSAY OF CREATININE: CPT | Performed by: OTOLARYNGOLOGY

## 2025-06-30 RX ADMIN — IOHEXOL 75 ML: 350 INJECTION, SOLUTION INTRAVENOUS at 11:25

## 2025-07-02 ENCOUNTER — TELEPHONE (OUTPATIENT)
Dept: ADMISSION | Facility: HOSPITAL | Age: 60
End: 2025-07-02
Payer: COMMERCIAL

## 2025-07-02 ENCOUNTER — TELEPHONE (OUTPATIENT)
Dept: OTOLARYNGOLOGY | Facility: HOSPITAL | Age: 60
End: 2025-07-02
Payer: COMMERCIAL

## 2025-07-02 DIAGNOSIS — J47.9 BRONCHIECTASIS WITHOUT COMPLICATION (MULTI): Primary | ICD-10-CM

## 2025-07-02 RX ORDER — DOXYCYCLINE 100 MG/1
100 CAPSULE ORAL 2 TIMES DAILY
Qty: 20 CAPSULE | Refills: 0 | Status: SHIPPED | OUTPATIENT
Start: 2025-07-02 | End: 2025-07-12

## 2025-07-02 NOTE — TELEPHONE ENCOUNTER
Blane Sharma called the refill line for Oxycodone 10mg. Would like refills to be sent to [facility] pharmacy on file. Message sent to Palliative Care team to send in.

## 2025-07-02 NOTE — TELEPHONE ENCOUNTER
I called this patient regarding the results of his CT scan.  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  I will see him at his regular appointment.

## 2025-08-04 ENCOUNTER — TELEPHONE (OUTPATIENT)
Dept: ADMISSION | Facility: HOSPITAL | Age: 60
End: 2025-08-04
Payer: COMMERCIAL

## 2025-08-04 DIAGNOSIS — G89.3 CANCER RELATED PAIN: ICD-10-CM

## 2025-08-04 RX ORDER — OXYCODONE HYDROCHLORIDE 10 MG/1
10 TABLET ORAL EVERY 6 HOURS PRN
Qty: 120 TABLET | Refills: 0 | Status: SHIPPED | OUTPATIENT
Start: 2025-08-04 | End: 2025-09-03

## 2025-08-04 NOTE — TELEPHONE ENCOUNTER
Refills pended to provider for oxycodone IR 10 mg every 6 hours 120/30.  OARRS reviewed.  Due for refill.  Patient to follow up with Mona Montanez on 9/9.

## 2025-08-04 NOTE — TELEPHONE ENCOUNTER
Pt requesting refill   Oxyodone 10mg. 1 tablet every 6 hrs prn  Pharmacy: [facility] in [place]  Last FUV 6/10, next FUV 9/9

## 2025-09-03 ENCOUNTER — TELEPHONE (OUTPATIENT)
Dept: ADMISSION | Facility: HOSPITAL | Age: 60
End: 2025-09-03
Payer: COMMERCIAL

## 2025-09-03 DIAGNOSIS — G89.3 CANCER RELATED PAIN: ICD-10-CM

## 2025-09-03 RX ORDER — OXYCODONE HYDROCHLORIDE 10 MG/1
10 TABLET ORAL EVERY 6 HOURS PRN
Qty: 120 TABLET | Refills: 0 | Status: SHIPPED | OUTPATIENT
Start: 2025-09-03 | End: 2025-10-03